# Patient Record
Sex: MALE | Race: WHITE | Employment: OTHER | URBAN - METROPOLITAN AREA
[De-identification: names, ages, dates, MRNs, and addresses within clinical notes are randomized per-mention and may not be internally consistent; named-entity substitution may affect disease eponyms.]

---

## 2017-03-20 ENCOUNTER — HOSPITAL ENCOUNTER (OUTPATIENT)
Age: 77
Discharge: HOME | End: 2017-03-20
Payer: MEDICARE

## 2017-05-01 ENCOUNTER — HOSPITAL ENCOUNTER (OUTPATIENT)
Age: 77
Discharge: HOME | End: 2017-05-01
Payer: MEDICARE

## 2017-06-16 ENCOUNTER — HOSPITAL ENCOUNTER (OUTPATIENT)
Age: 77
Setting detail: SPECIMEN
Discharge: HOME | End: 2017-06-16
Payer: MEDICARE

## 2017-06-16 PROCEDURE — 87493 C DIFF AMPLIFIED PROBE: CPT

## 2017-06-17 LAB
C DIFFICILE TOXINS, PCR: ABNORMAL
SPECIMEN DESCRIPTION: ABNORMAL

## 2021-12-21 ENCOUNTER — HOSPITAL ENCOUNTER (INPATIENT)
Age: 81
LOS: 1 days | Discharge: HOME | DRG: 191 | End: 2021-12-23
Attending: EMERGENCY MEDICINE | Admitting: INTERNAL MEDICINE
Payer: MEDICARE

## 2021-12-21 ENCOUNTER — APPOINTMENT (OUTPATIENT)
Dept: CT IMAGING | Facility: CLINIC | Age: 81
DRG: 191 | End: 2021-12-21
Payer: MEDICARE

## 2021-12-21 ENCOUNTER — APPOINTMENT (OUTPATIENT)
Dept: GENERAL RADIOLOGY | Facility: CLINIC | Age: 81
DRG: 191 | End: 2021-12-21
Payer: MEDICARE

## 2021-12-21 DIAGNOSIS — R09.02 HYPOXIA: ICD-10-CM

## 2021-12-21 DIAGNOSIS — J18.9 PNEUMONIA DUE TO INFECTIOUS ORGANISM, UNSPECIFIED LATERALITY, UNSPECIFIED PART OF LUNG: Primary | ICD-10-CM

## 2021-12-21 LAB
ABSOLUTE EOS #: 0.08 K/UL (ref 0–0.4)
ABSOLUTE IMMATURE GRANULOCYTE: ABNORMAL K/UL (ref 0–0.3)
ABSOLUTE LYMPH #: 0.47 K/UL (ref 1–4.8)
ABSOLUTE MONO #: 0.78 K/UL (ref 0.1–0.8)
ALBUMIN SERPL-MCNC: 4.3 G/DL (ref 3.5–5.2)
ALBUMIN/GLOBULIN RATIO: 2.4 (ref 1–2.5)
ALP BLD-CCNC: 76 U/L (ref 40–129)
ALT SERPL-CCNC: 16 U/L (ref 5–41)
ANION GAP SERPL CALCULATED.3IONS-SCNC: 8 MMOL/L (ref 9–17)
AST SERPL-CCNC: 17 U/L
ATYPICAL LYMPHOCYTE ABSOLUTE COUNT: 0.16 K/UL
ATYPICAL LYMPHOCYTES: 2 %
BASOPHILS # BLD: 0 % (ref 0–2)
BASOPHILS ABSOLUTE: 0 K/UL (ref 0–0.2)
BILIRUB SERPL-MCNC: 0.7 MG/DL (ref 0.3–1.2)
BNP INTERPRETATION: ABNORMAL
BUN BLDV-MCNC: 19 MG/DL (ref 8–23)
BUN/CREAT BLD: ABNORMAL (ref 9–20)
CALCIUM SERPL-MCNC: 9.4 MG/DL (ref 8.6–10.4)
CHLORIDE BLD-SCNC: 98 MMOL/L (ref 98–107)
CO2: 30 MMOL/L (ref 20–31)
CREAT SERPL-MCNC: 1.4 MG/DL (ref 0.7–1.2)
DIFFERENTIAL TYPE: ABNORMAL
DIRECT EXAM: NORMAL
EOSINOPHILS RELATIVE PERCENT: 1 % (ref 1–4)
GFR AFRICAN AMERICAN: 59 ML/MIN
GFR NON-AFRICAN AMERICAN: 49 ML/MIN
GFR SERPL CREATININE-BSD FRML MDRD: ABNORMAL ML/MIN/{1.73_M2}
GFR SERPL CREATININE-BSD FRML MDRD: ABNORMAL ML/MIN/{1.73_M2}
GLUCOSE BLD-MCNC: 110 MG/DL (ref 70–99)
HCT VFR BLD CALC: 39.6 % (ref 41–53)
HEMOGLOBIN: 13.5 G/DL (ref 13.5–17.5)
IMMATURE GRANULOCYTES: ABNORMAL %
INR BLD: 1.3
LACTIC ACID, SEPSIS WHOLE BLOOD: NORMAL MMOL/L (ref 0.5–1.9)
LACTIC ACID, SEPSIS: 1.2 MMOL/L (ref 0.5–1.9)
LIPASE: 16 U/L (ref 13–60)
LYMPHOCYTES # BLD: 6 % (ref 24–44)
Lab: NORMAL
MCH RBC QN AUTO: 32.5 PG (ref 26–34)
MCHC RBC AUTO-ENTMCNC: 34 G/DL (ref 31–37)
MCV RBC AUTO: 95.8 FL (ref 80–100)
MONOCYTES # BLD: 10 % (ref 1–7)
MORPHOLOGY: NORMAL
NRBC AUTOMATED: ABNORMAL PER 100 WBC
PARTIAL THROMBOPLASTIN TIME: 32.9 SEC (ref 21.3–31.3)
PDW BLD-RTO: 12.4 % (ref 12.5–15.4)
PLATELET # BLD: 231 K/UL (ref 140–450)
PLATELET ESTIMATE: ABNORMAL
PMV BLD AUTO: 8 FL (ref 6–12)
POTASSIUM SERPL-SCNC: 4.1 MMOL/L (ref 3.7–5.3)
PRO-BNP: 329 PG/ML
PROTHROMBIN TIME: 14 SEC (ref 9.4–12.6)
RBC # BLD: 4.13 M/UL (ref 4.5–5.9)
RBC # BLD: ABNORMAL 10*6/UL
SARS-COV-2, RAPID: NOT DETECTED
SEG NEUTROPHILS: 81 % (ref 36–66)
SEGMENTED NEUTROPHILS ABSOLUTE COUNT: 6.31 K/UL (ref 1.8–7.7)
SODIUM BLD-SCNC: 136 MMOL/L (ref 135–144)
SPECIMEN DESCRIPTION: NORMAL
SPECIMEN DESCRIPTION: NORMAL
TOTAL PROTEIN: 6.1 G/DL (ref 6.4–8.3)
TROPONIN INTERP: NORMAL
TROPONIN T: NORMAL NG/ML
TROPONIN, HIGH SENSITIVITY: 19 NG/L (ref 0–22)
WBC # BLD: 7.8 K/UL (ref 3.5–11)
WBC # BLD: ABNORMAL 10*3/UL

## 2021-12-21 PROCEDURE — 2580000003 HC RX 258: Performed by: EMERGENCY MEDICINE

## 2021-12-21 PROCEDURE — 87040 BLOOD CULTURE FOR BACTERIA: CPT

## 2021-12-21 PROCEDURE — 99285 EMERGENCY DEPT VISIT HI MDM: CPT

## 2021-12-21 PROCEDURE — 71045 X-RAY EXAM CHEST 1 VIEW: CPT

## 2021-12-21 PROCEDURE — 6370000000 HC RX 637 (ALT 250 FOR IP): Performed by: EMERGENCY MEDICINE

## 2021-12-21 PROCEDURE — 87804 INFLUENZA ASSAY W/OPTIC: CPT

## 2021-12-21 PROCEDURE — 80053 COMPREHEN METABOLIC PANEL: CPT

## 2021-12-21 PROCEDURE — 85730 THROMBOPLASTIN TIME PARTIAL: CPT

## 2021-12-21 PROCEDURE — 71260 CT THORAX DX C+: CPT

## 2021-12-21 PROCEDURE — 85610 PROTHROMBIN TIME: CPT

## 2021-12-21 PROCEDURE — 96361 HYDRATE IV INFUSION ADD-ON: CPT

## 2021-12-21 PROCEDURE — 84484 ASSAY OF TROPONIN QUANT: CPT

## 2021-12-21 PROCEDURE — 83880 ASSAY OF NATRIURETIC PEPTIDE: CPT

## 2021-12-21 PROCEDURE — 83605 ASSAY OF LACTIC ACID: CPT

## 2021-12-21 PROCEDURE — 87635 SARS-COV-2 COVID-19 AMP PRB: CPT

## 2021-12-21 PROCEDURE — 96365 THER/PROPH/DIAG IV INF INIT: CPT

## 2021-12-21 PROCEDURE — 36415 COLL VENOUS BLD VENIPUNCTURE: CPT

## 2021-12-21 PROCEDURE — 6360000004 HC RX CONTRAST MEDICATION: Performed by: EMERGENCY MEDICINE

## 2021-12-21 PROCEDURE — 96360 HYDRATION IV INFUSION INIT: CPT

## 2021-12-21 PROCEDURE — 83690 ASSAY OF LIPASE: CPT

## 2021-12-21 PROCEDURE — 85025 COMPLETE CBC W/AUTO DIFF WBC: CPT

## 2021-12-21 RX ORDER — GLIMEPIRIDE 2 MG/1
2 TABLET ORAL
COMMUNITY
Start: 2021-12-06

## 2021-12-21 RX ORDER — SODIUM CHLORIDE 0.9 % (FLUSH) 0.9 %
10 SYRINGE (ML) INJECTION PRN
Status: COMPLETED | OUTPATIENT
Start: 2021-12-21 | End: 2021-12-21

## 2021-12-21 RX ORDER — METOPROLOL SUCCINATE 25 MG/1
25 TABLET, EXTENDED RELEASE ORAL DAILY
COMMUNITY
Start: 2021-04-01

## 2021-12-21 RX ORDER — MAGNESIUM OXIDE 400 MG/1
400 TABLET ORAL DAILY
COMMUNITY
Start: 2021-09-01

## 2021-12-21 RX ORDER — ATORVASTATIN CALCIUM 10 MG/1
10 TABLET, FILM COATED ORAL NIGHTLY
COMMUNITY
Start: 2021-04-01

## 2021-12-21 RX ORDER — PENTOXIFYLLINE 400 MG/1
400 TABLET, EXTENDED RELEASE ORAL 2 TIMES DAILY
COMMUNITY

## 2021-12-21 RX ORDER — 0.9 % SODIUM CHLORIDE 0.9 %
1000 INTRAVENOUS SOLUTION INTRAVENOUS ONCE
Status: COMPLETED | OUTPATIENT
Start: 2021-12-21 | End: 2021-12-22

## 2021-12-21 RX ORDER — TAMSULOSIN HYDROCHLORIDE 0.4 MG/1
0.4 CAPSULE ORAL 2 TIMES DAILY
COMMUNITY

## 2021-12-21 RX ORDER — 0.9 % SODIUM CHLORIDE 0.9 %
70 INTRAVENOUS SOLUTION INTRAVENOUS ONCE
Status: COMPLETED | OUTPATIENT
Start: 2021-12-21 | End: 2021-12-21

## 2021-12-21 RX ORDER — ACETAMINOPHEN 500 MG
1000 TABLET ORAL ONCE
Status: COMPLETED | OUTPATIENT
Start: 2021-12-21 | End: 2021-12-21

## 2021-12-21 RX ADMIN — SODIUM CHLORIDE 70 ML: 9 INJECTION, SOLUTION INTRAVENOUS at 23:46

## 2021-12-21 RX ADMIN — SODIUM CHLORIDE 1000 ML: 9 INJECTION, SOLUTION INTRAVENOUS at 21:59

## 2021-12-21 RX ADMIN — Medication 10 ML: at 23:44

## 2021-12-21 RX ADMIN — IOPAMIDOL 75 ML: 755 INJECTION, SOLUTION INTRAVENOUS at 23:45

## 2021-12-21 RX ADMIN — ACETAMINOPHEN 1000 MG: 500 TABLET ORAL at 21:16

## 2021-12-21 ASSESSMENT — PAIN SCALES - GENERAL: PAINLEVEL_OUTOF10: 0

## 2021-12-22 PROBLEM — I25.10 CORONARY ATHEROSCLEROSIS: Status: ACTIVE | Noted: 2021-12-22

## 2021-12-22 PROBLEM — E11.9 TYPE 2 DIABETES MELLITUS WITHOUT COMPLICATION (HCC): Status: ACTIVE | Noted: 2021-12-22

## 2021-12-22 PROBLEM — Z20.822 COVID-19 RULED OUT: Status: ACTIVE | Noted: 2021-12-22

## 2021-12-22 PROBLEM — R09.02 HYPOXIA: Status: ACTIVE | Noted: 2021-12-22

## 2021-12-22 PROBLEM — I50.22 CHRONIC SYSTOLIC CONGESTIVE HEART FAILURE (HCC): Status: ACTIVE | Noted: 2019-05-03

## 2021-12-22 PROBLEM — J44.9 CHRONIC OBSTRUCTIVE PULMONARY DISEASE (HCC): Status: ACTIVE | Noted: 2021-06-04

## 2021-12-22 PROBLEM — I48.0 PAROXYSMAL ATRIAL FIBRILLATION (HCC): Status: ACTIVE | Noted: 2021-06-04

## 2021-12-22 LAB
ADENOVIRUS PCR: NOT DETECTED
BORDETELLA PARAPERTUSSIS: NOT DETECTED
BORDETELLA PERTUSSIS PCR: NOT DETECTED
CHLAMYDIA PNEUMONIAE BY PCR: NOT DETECTED
CORONAVIRUS 229E PCR: NOT DETECTED
CORONAVIRUS HKU1 PCR: NOT DETECTED
CORONAVIRUS NL63 PCR: NOT DETECTED
CORONAVIRUS OC43 PCR: NOT DETECTED
EKG ATRIAL RATE: 52 BPM
EKG P AXIS: 82 DEGREES
EKG P-R INTERVAL: 220 MS
EKG Q-T INTERVAL: 434 MS
EKG QRS DURATION: 80 MS
EKG QTC CALCULATION (BAZETT): 403 MS
EKG R AXIS: 1 DEGREES
EKG T AXIS: 76 DEGREES
EKG VENTRICULAR RATE: 52 BPM
HUMAN METAPNEUMOVIRUS PCR: NOT DETECTED
INFLUENZA A BY PCR: NOT DETECTED
INFLUENZA A H1 (2009) PCR: NORMAL
INFLUENZA A H1 PCR: NORMAL
INFLUENZA A H3 PCR: NORMAL
INFLUENZA B BY PCR: NOT DETECTED
MYCOPLASMA PNEUMONIAE PCR: NOT DETECTED
PARAINFLUENZA 1 PCR: NOT DETECTED
PARAINFLUENZA 2 PCR: NOT DETECTED
PARAINFLUENZA 3 PCR: NOT DETECTED
PARAINFLUENZA 4 PCR: NOT DETECTED
RESP SYNCYTIAL VIRUS PCR: NOT DETECTED
RHINO/ENTEROVIRUS PCR: NOT DETECTED
SARS-COV-2, PCR: NOT DETECTED
SPECIMEN DESCRIPTION: NORMAL

## 2021-12-22 PROCEDURE — 6370000000 HC RX 637 (ALT 250 FOR IP): Performed by: NURSE PRACTITIONER

## 2021-12-22 PROCEDURE — 2700000000 HC OXYGEN THERAPY PER DAY

## 2021-12-22 PROCEDURE — 6360000002 HC RX W HCPCS: Performed by: NURSE PRACTITIONER

## 2021-12-22 PROCEDURE — 6360000002 HC RX W HCPCS: Performed by: EMERGENCY MEDICINE

## 2021-12-22 PROCEDURE — 93005 ELECTROCARDIOGRAM TRACING: CPT | Performed by: EMERGENCY MEDICINE

## 2021-12-22 PROCEDURE — 99222 1ST HOSP IP/OBS MODERATE 55: CPT | Performed by: NURSE PRACTITIONER

## 2021-12-22 PROCEDURE — 2580000003 HC RX 258: Performed by: NURSE PRACTITIONER

## 2021-12-22 PROCEDURE — 0202U NFCT DS 22 TRGT SARS-COV-2: CPT

## 2021-12-22 PROCEDURE — 2060000000 HC ICU INTERMEDIATE R&B

## 2021-12-22 RX ORDER — NICOTINE POLACRILEX 4 MG
15 LOZENGE BUCCAL PRN
Status: DISCONTINUED | OUTPATIENT
Start: 2021-12-22 | End: 2021-12-23 | Stop reason: HOSPADM

## 2021-12-22 RX ORDER — IPRATROPIUM BROMIDE AND ALBUTEROL SULFATE 2.5; .5 MG/3ML; MG/3ML
1 SOLUTION RESPIRATORY (INHALATION)
Status: DISCONTINUED | OUTPATIENT
Start: 2021-12-22 | End: 2021-12-22

## 2021-12-22 RX ORDER — DEXTROSE MONOHYDRATE 50 MG/ML
100 INJECTION, SOLUTION INTRAVENOUS PRN
Status: DISCONTINUED | OUTPATIENT
Start: 2021-12-22 | End: 2021-12-23 | Stop reason: HOSPADM

## 2021-12-22 RX ORDER — ONDANSETRON 4 MG/1
4 TABLET, ORALLY DISINTEGRATING ORAL EVERY 8 HOURS PRN
Status: DISCONTINUED | OUTPATIENT
Start: 2021-12-22 | End: 2021-12-23 | Stop reason: HOSPADM

## 2021-12-22 RX ORDER — BUMETANIDE 1 MG/1
1 TABLET ORAL DAILY
COMMUNITY

## 2021-12-22 RX ORDER — BUMETANIDE 1 MG/1
1 TABLET ORAL DAILY
Status: DISCONTINUED | OUTPATIENT
Start: 2021-12-22 | End: 2021-12-23 | Stop reason: HOSPADM

## 2021-12-22 RX ORDER — PREDNISONE 20 MG/1
40 TABLET ORAL DAILY
Status: DISCONTINUED | OUTPATIENT
Start: 2021-12-22 | End: 2021-12-23 | Stop reason: HOSPADM

## 2021-12-22 RX ORDER — DEXTROSE MONOHYDRATE 25 G/50ML
12.5 INJECTION, SOLUTION INTRAVENOUS PRN
Status: DISCONTINUED | OUTPATIENT
Start: 2021-12-22 | End: 2021-12-23 | Stop reason: HOSPADM

## 2021-12-22 RX ORDER — POLYETHYLENE GLYCOL 3350 17 G/17G
17 POWDER, FOR SOLUTION ORAL DAILY PRN
Status: DISCONTINUED | OUTPATIENT
Start: 2021-12-22 | End: 2021-12-23 | Stop reason: HOSPADM

## 2021-12-22 RX ORDER — ACETAMINOPHEN 325 MG/1
650 TABLET ORAL EVERY 6 HOURS PRN
Status: DISCONTINUED | OUTPATIENT
Start: 2021-12-22 | End: 2021-12-23 | Stop reason: HOSPADM

## 2021-12-22 RX ORDER — MAGNESIUM SULFATE 1 G/100ML
1000 INJECTION INTRAVENOUS ONCE
Status: COMPLETED | OUTPATIENT
Start: 2021-12-22 | End: 2021-12-22

## 2021-12-22 RX ORDER — LEVOFLOXACIN 5 MG/ML
750 INJECTION, SOLUTION INTRAVENOUS ONCE
Status: COMPLETED | OUTPATIENT
Start: 2021-12-22 | End: 2021-12-22

## 2021-12-22 RX ORDER — SODIUM CHLORIDE 9 MG/ML
INJECTION, SOLUTION INTRAVENOUS CONTINUOUS
Status: DISCONTINUED | OUTPATIENT
Start: 2021-12-22 | End: 2021-12-23 | Stop reason: HOSPADM

## 2021-12-22 RX ORDER — FAMOTIDINE 20 MG/1
20 TABLET, FILM COATED ORAL NIGHTLY
Status: DISCONTINUED | OUTPATIENT
Start: 2021-12-22 | End: 2021-12-23 | Stop reason: HOSPADM

## 2021-12-22 RX ORDER — FAMOTIDINE 20 MG/1
20 TABLET, FILM COATED ORAL NIGHTLY
COMMUNITY

## 2021-12-22 RX ORDER — ONDANSETRON 2 MG/ML
4 INJECTION INTRAMUSCULAR; INTRAVENOUS EVERY 6 HOURS PRN
Status: DISCONTINUED | OUTPATIENT
Start: 2021-12-22 | End: 2021-12-23 | Stop reason: HOSPADM

## 2021-12-22 RX ORDER — GLIPIZIDE 5 MG/1
5 TABLET ORAL
Status: DISCONTINUED | OUTPATIENT
Start: 2021-12-23 | End: 2021-12-23 | Stop reason: HOSPADM

## 2021-12-22 RX ORDER — TAMSULOSIN HYDROCHLORIDE 0.4 MG/1
0.4 CAPSULE ORAL 2 TIMES DAILY
Status: DISCONTINUED | OUTPATIENT
Start: 2021-12-22 | End: 2021-12-23 | Stop reason: HOSPADM

## 2021-12-22 RX ORDER — ACETAMINOPHEN 650 MG/1
650 SUPPOSITORY RECTAL EVERY 6 HOURS PRN
Status: DISCONTINUED | OUTPATIENT
Start: 2021-12-22 | End: 2021-12-23 | Stop reason: HOSPADM

## 2021-12-22 RX ORDER — ATORVASTATIN CALCIUM 10 MG/1
10 TABLET, FILM COATED ORAL NIGHTLY
Status: DISCONTINUED | OUTPATIENT
Start: 2021-12-22 | End: 2021-12-23 | Stop reason: HOSPADM

## 2021-12-22 RX ORDER — SODIUM CHLORIDE 9 MG/ML
25 INJECTION, SOLUTION INTRAVENOUS PRN
Status: DISCONTINUED | OUTPATIENT
Start: 2021-12-22 | End: 2021-12-23 | Stop reason: HOSPADM

## 2021-12-22 RX ORDER — METHYLPREDNISOLONE SODIUM SUCCINATE 125 MG/2ML
125 INJECTION, POWDER, LYOPHILIZED, FOR SOLUTION INTRAMUSCULAR; INTRAVENOUS ONCE
Status: COMPLETED | OUTPATIENT
Start: 2021-12-22 | End: 2021-12-22

## 2021-12-22 RX ORDER — SODIUM CHLORIDE 0.9 % (FLUSH) 0.9 %
5-40 SYRINGE (ML) INJECTION PRN
Status: DISCONTINUED | OUTPATIENT
Start: 2021-12-22 | End: 2021-12-23 | Stop reason: HOSPADM

## 2021-12-22 RX ORDER — METOPROLOL SUCCINATE 25 MG/1
25 TABLET, EXTENDED RELEASE ORAL DAILY
Status: DISCONTINUED | OUTPATIENT
Start: 2021-12-23 | End: 2021-12-23 | Stop reason: HOSPADM

## 2021-12-22 RX ORDER — PENTOXIFYLLINE 400 MG/1
400 TABLET, EXTENDED RELEASE ORAL 2 TIMES DAILY
Status: DISCONTINUED | OUTPATIENT
Start: 2021-12-22 | End: 2021-12-23 | Stop reason: HOSPADM

## 2021-12-22 RX ORDER — SODIUM CHLORIDE 0.9 % (FLUSH) 0.9 %
5-40 SYRINGE (ML) INJECTION EVERY 12 HOURS SCHEDULED
Status: DISCONTINUED | OUTPATIENT
Start: 2021-12-22 | End: 2021-12-23 | Stop reason: HOSPADM

## 2021-12-22 RX ORDER — ALBUTEROL SULFATE 2.5 MG/3ML
2.5 SOLUTION RESPIRATORY (INHALATION)
Status: DISCONTINUED | OUTPATIENT
Start: 2021-12-22 | End: 2021-12-23 | Stop reason: HOSPADM

## 2021-12-22 RX ADMIN — ATORVASTATIN CALCIUM 10 MG: 10 TABLET, FILM COATED ORAL at 21:43

## 2021-12-22 RX ADMIN — TAMSULOSIN HYDROCHLORIDE 0.4 MG: 0.4 CAPSULE ORAL at 21:43

## 2021-12-22 RX ADMIN — SODIUM CHLORIDE, PRESERVATIVE FREE 10 ML: 5 INJECTION INTRAVENOUS at 21:47

## 2021-12-22 RX ADMIN — SODIUM CHLORIDE: 9 INJECTION, SOLUTION INTRAVENOUS at 21:46

## 2021-12-22 RX ADMIN — MAGNESIUM SULFATE HEPTAHYDRATE 1000 MG: 1 INJECTION, SOLUTION INTRAVENOUS at 02:58

## 2021-12-22 RX ADMIN — BUMETANIDE 1 MG: 1 TABLET ORAL at 14:10

## 2021-12-22 RX ADMIN — LEVOFLOXACIN 750 MG: 5 INJECTION, SOLUTION INTRAVENOUS at 01:32

## 2021-12-22 RX ADMIN — PENTOXIFYLLINE 400 MG: 400 TABLET, EXTENDED RELEASE ORAL at 21:43

## 2021-12-22 RX ADMIN — FAMOTIDINE 20 MG: 20 TABLET ORAL at 21:43

## 2021-12-22 RX ADMIN — AZITHROMYCIN DIHYDRATE 500 MG: 500 INJECTION, POWDER, LYOPHILIZED, FOR SOLUTION INTRAVENOUS at 04:05

## 2021-12-22 RX ADMIN — RIVAROXABAN 20 MG: 20 TABLET, FILM COATED ORAL at 17:00

## 2021-12-22 RX ADMIN — METHYLPREDNISOLONE SODIUM SUCCINATE 125 MG: 125 INJECTION, POWDER, FOR SOLUTION INTRAMUSCULAR; INTRAVENOUS at 02:56

## 2021-12-22 RX ADMIN — SODIUM CHLORIDE: 9 INJECTION, SOLUTION INTRAVENOUS at 05:17

## 2021-12-22 RX ADMIN — PREDNISONE 40 MG: 20 TABLET ORAL at 14:04

## 2021-12-22 RX ADMIN — SODIUM CHLORIDE, PRESERVATIVE FREE 10 ML: 5 INJECTION INTRAVENOUS at 10:00

## 2021-12-22 ASSESSMENT — PAIN SCALES - GENERAL: PAINLEVEL_OUTOF10: 0

## 2021-12-22 NOTE — PLAN OF CARE
Problem: Falls - Risk of:  Goal: Will remain free from falls  Description: Will remain free from falls  Outcome: Ongoing  Note: Patient remains free from falls and injuries. Call light with in reach and patient close to nurse station. Will continue to round hourly and more often as needed.

## 2021-12-22 NOTE — ED PROVIDER NOTES
[facility]  [facility]  Phone: [phone]      Pt Name: Kerrie Jones  ROP:[id number]  [place] 1940  Date of evaluation: 12/21/2021      CHIEF COMPLAINT       Chief Complaint   Patient presents with    Fever    Fatigue       HISTORY OF PRESENT ILLNESS   Kerrie Jones is a [de-identified] y.o. male with history of CHF, type 2 diabetes, hypertension, hyperlipidemia, CAD status post 1 stent, non-Hodgkin's lymphoma, A. fib on Xarelto and cholecystectomy who presents for evaluation of generalized malaise and chills. History is supplemented by the patient's son who is at bedside. The patient reports that starting this afternoon he developed chills and lay down for a 3-hour nap. He woke up but felt fatigued with decreased appetite, fever of 100.4, generalized weakness, increased urinary output, and increased thirst.  The patient states that he has had similar symptoms in the past when he had pneumonia. His family took his pulse ox and he was found to have oxygen desaturations into the 80s. The patient did not take any medications for symptoms and does not list any provoking or palliating factors. He is on Xarelto for A. fib and denies any history of blood clots. The patient denies missing any doses of his medications. He had both vaccinations against COVID-19 and received his booster approximately 6 weeks ago. The patient denies headache, vision changes, neck pain, back pain, chest pain, shortness of breath, nausea, vomiting, sick contacts, abdominal pain, bowel symptoms, focal weakness, numbness, tingling, recent injury or illness. REVIEW OF SYSTEMS     Ten point review of systems was reviewed and is negative unless otherwise noted in the HPI    [address]    has a past medical history of CAD (coronary artery disease), CHF (congestive heart failure) ([facility] [address] Diabetes mellitus ([facility] [address] and Hypertension. SURGICAL HISTORY      has no past surgical history on file.   The radiation dose to as low as reasonably achievable. COMPARISON: None. HISTORY: ORDERING SYSTEM PROVIDED HISTORY: hypoxia, fever, abnormal nodule seen on xr solomon TECHNOLOGIST PROVIDED HISTORY: hypoxia, fever, abnormal nodule seen on xr solomon Decision Support Exception - unselect if not a suspected or confirmed emergency medical condition->Emergency Medical Condition (MA) Reason for Exam: Fever, fatigue, and weakness tonight FINDINGS: Pulmonary Arteries: Pulmonary arteries are adequately opacified for evaluation. No evidence of intraluminal filling defect to suggest pulmonary embolism. Main pulmonary artery is normal in caliber. Mediastinum: Atherosclerosis of the thoracic aorta and coronary arteries. No pericardial effusion. No adenopathy. Lungs/pleura: No pneumothorax. No pleural effusion. No pulmonary consolidation, mass, or suspicious nodule. Dependent atelectasis in the bilateral lower lobes. Scattered linear scars in the bilateral lungs. Upper Abdomen: Limited images of the upper abdomen are unremarkable. Soft Tissues/Bones: Multilevel thoracic spondylosis. No acute finding in the chest with no evidence of pulmonary embolism. RECOMMENDATIONS: Unavailable       LABS:  Results for orders placed or performed during the hospital encounter of 12/21/21   Rapid influenza A/B antigens    Specimen: Nares   Result Value Ref Range    Specimen Description . NARES     Special Requests NOT REPORTED     Direct Exam       NEGATIVE for Influenza A + B antigens. PCR testing to confirm this result is available upon request.  Specimen will be saved in the laboratory for 7 days. Please call [phone] if PCR testing is indicated. COVID-19, Rapid    Specimen: Nasopharyngeal Swab   Result Value Ref Range    Specimen Description . NASOPHARYNGEAL SWAB     SARS-CoV-2, Rapid Not Detected Not Detected   CBC auto differential   Result Value Ref Range    WBC 7.8 3.5 - 11.0 k/uL    RBC 4.13 (L) 4.5 - 5.9 m/uL    Hemoglobin 13.5 13.5 - 17.5 g/dL    Hematocrit 39.6 (L) 41 - 53 %    MCV 95.8 80 - 100 fL    MCH 32.5 26 - 34 pg    MCHC 34.0 31 - 37 g/dL    RDW 12.4 (L) 12.5 - 15.4 %    Platelets 323 721 - 400 k/uL    MPV 8.0 6.0 - 12.0 fL    NRBC Automated NOT REPORTED per 100 WBC    Differential Type NOT REPORTED     Immature Granulocytes NOT REPORTED 0 %    Absolute Immature Granulocyte NOT REPORTED 0.00 - 0.30 k/uL    WBC Morphology NOT REPORTED     RBC Morphology NOT REPORTED     Platelet Estimate NOT REPORTED     Seg Neutrophils 81 (H) 36 - 66 %    Lymphocytes 6 (L) 24 - 44 %    Atypical Lymphocytes 2 %    Monocytes 10 (H) 1 - 7 %    Eosinophils % 1 1 - 4 %    Basophils 0 0 - 2 %    Segs Absolute 6.31 1.8 - 7.7 k/uL    Absolute Lymph # 0.47 (L) 1.0 - 4.8 k/uL    Atypical Lymphocytes Absolute 0.16 k/uL    Absolute Mono # 0.78 0.1 - 0.8 k/uL    Absolute Eos # 0.08 0.0 - 0.4 k/uL    Basophils Absolute 0.00 0.0 - 0.2 k/uL    Morphology Normal    Comprehensive Metabolic Panel w/ Reflex to MG   Result Value Ref Range    Glucose 110 (H) 70 - 99 mg/dL    BUN 19 8 - 23 mg/dL    CREATININE 1.40 (H) 0.70 - 1.20 mg/dL    Bun/Cre Ratio NOT REPORTED 9 - 20    Calcium 9.4 8.6 - 10.4 mg/dL    Sodium 136 135 - 144 mmol/L    Potassium 4.1 3.7 - 5.3 mmol/L    Chloride 98 98 - 107 mmol/L    CO2 30 20 - 31 mmol/L    Anion Gap 8 (L) 9 - 17 mmol/L    Alkaline Phosphatase 76 40 - 129 U/L    ALT 16 5 - 41 U/L    AST 17 <40 U/L    Total Bilirubin 0.70 0.3 - 1.2 mg/dL    Total Protein 6.1 (L) 6.4 - 8.3 g/dL    Albumin 4.3 3.5 - 5.2 g/dL    Albumin/Globulin Ratio 2.4 1.0 - 2.5    GFR Non- 49 (L) >60 mL/min    GFR  59 (L) >60 mL/min    GFR Comment          GFR Staging NOT REPORTED    Lactate, Sepsis   Result Value Ref Range    Lactic Acid, Sepsis 1.2 0.5 - 1.9 mmol/L    Lactic Acid, Sepsis, Whole Blood NOT REPORTED 0.5 - 1.9 mmol/L   APTT   Result Value Ref Range    PTT 32.9 (H) 21.3 - 31.3 sec Protime-INR   Result Value Ref Range    Protime 14.0 (H) 9.4 - 12.6 sec    INR 1.3    Lipase   Result Value Ref Range    Lipase 16 13 - 60 U/L   Troponin   Result Value Ref Range    Troponin, High Sensitivity 19 0 - 22 ng/L    Troponin T NOT REPORTED <0.03 ng/mL    Troponin Interp NOT REPORTED    Brain Natriuretic Peptide   Result Value Ref Range    Pro- (H) <300 pg/mL    BNP Interpretation NOT REPORTED        EMERGENCY DEPARTMENT COURSE:        The patient was given the following medications:  Orders Placed This Encounter   Medications    0.9 % sodium chloride bolus    acetaminophen (TYLENOL) tablet 1,000 mg    0.9 % sodium chloride bolus    iopamidol (ISOVUE-370) 76 % injection 75 mL    sodium chloride flush 0.9 % injection 10 mL    DISCONTD: doxycycline (VIBRAMYCIN) 100 mg in dextrose 5 % 100 mL IVPB     Order Specific Question:   Antimicrobial Indications     Answer:   Pneumonia (CAP)    levoFLOXacin (LEVAQUIN) 750 MG/150ML infusion 750 mg     Order Specific Question:   Antimicrobial Indications     Answer:   Pneumonia (CAP)    methylPREDNISolone sodium (SOLU-MEDROL) injection 125 mg    magnesium sulfate 1000 mg in dextrose 5% 100 mL IVPB        Vitals:    Vitals:    12/21/21 2236 12/22/21 0000 12/22/21 0135 12/22/21 0303   BP:  129/70 126/70 (!) 125/108   Pulse: 52 54 55 57   Resp: 15 16 16 16   Temp:    98.3 °F (36.8 °C)   TempSrc:    Oral   SpO2: 96% 97% 95% 96%   Weight:       Height:         -------------------------  BP: (!) 125/108, Temp: 98.3 °F (36.8 °C), Pulse: 57, Resp: 16    CONSULTS:  None    CRITICAL CARE:   None    PROCEDURES:  None    DIAGNOSIS/ MDM:   Marisa Jimenez is a [de-identified] y.o. male who presents with fever, fatigue, and hypoxia. The patient arrives hypoxic at 80% and was placed on 2 L of nasal cannula and has maintained oxygen saturation in the mid 90s with this. He defervesced with Tylenol. Septic work-up was initiated. Vital signs are otherwise stable.   Lungs are diminished at the bases. Mucous membranes dry. CBC is unremarkable. CMP reveals creatinine of 1.4 but this is unchanged from baseline. Lactic acid, coags, lipase, troponin are unremarkable. BNP is slightly elevated at 329 but he does not have any clinical signs of fluid overload. Rapid Covid and rapid flu are negative. Chest x-ray shows an irregular density projecting over the left lung base and radiology recommended CT. CT chest shows no acute findings in the chest and no PE. At this time I still have high clinical suspicion for pneumonia. He has an allergy to penicillins so I started him on Levaquin. I also treated him with Solu-Medrol and magnesium in case he has an underlying COPD component. There does not seem to be any significant CHF; however, I did not want to give him the full 30 cc/kg IV fluid bolus because he is not hypotensive, his lactic acid is normal, and he does have history of CHF. The patient is still requiring oxygen and will need to be admitted. I spoke to the [facility], [address], who agrees to accept the patient at [phone]. The patient will be boarding in the ER until a bed becomes available. FINAL IMPRESSION      1. Pneumonia due to infectious organism, unspecified laterality, unspecified part of lung    2.  Hypoxia          DISPOSITION/PLAN   DISPOSITION Admitted 12/22/2021 02:04:25 AM        (Please note that portions of this note were completed with a voice recognitionprogram.  Efforts were made to edit the dictations but occasionally words are mis-transcribed.)    Lacy Bonilla DO, [facility]  Emergency Physician Attending         Lacy Bonilla DO  12/22/21 1835

## 2021-12-22 NOTE — H&P
[facility]  Office: [address], DO, Deirdre Din, DO, Milton Acron, DO, Sim Mccormick Blood, DO, Francisco Javier Jones MD, Ariel Bowles MD, Tena Castellano MD, Gal Giron MD, Yulia Dasilva MD, Osmar Skaggs MD, Rocio Pickett MD, Nathan Dai, DO, Vito Lorenz, DO, Ida Cristina MD,  Ángel Reyes, DO, Umer Gould MD, Graciela Fish MD, Anum Carmona MD, Migel Castaneda MD, Freddie Mustafa MD, Felicitas Mcqueen MD, Geovanna Spencer MD, Laura Freire, [facility], [facility], CNP, Erin Mendoza, CNP, Laura Larry, CNS, Chalino Koo, CNP, Adelia Alpers, CNP, Ludin Rodriguez, CNP, Kendy Nevarez, CNP, [address], CNP, Raghav Harris PA-C, Radha Mccollum, [facility], Jorge Luis Paulson, [facility], Pardeep Pham, CNP, Tavia Billings, CNP, Andrew Young, CNP, Domenic Hale, CNP, Radha Rao, CNP, Katya Breaux, [address]    HISTORY AND PHYSICAL EXAMINATION            Date:   12/22/2021  Patient name:  Halle Carlos  Date of admission:  12/21/2021  8:09 PM  MRN:   [id number]  Account:  [de-identified]  YOB: 1940  PCP:    Hardeep Charles MD  Room:   [id number]  Code Status:    Full Code    Chief Complaint:     Chief Complaint   Patient presents with    Fever    Fatigue       History Obtained From:     patient    History of Present Illness:     Halle Carlos is a [de-identified] y.o. Unavailable / unknown male who presents with Fever and Fatigue   and is admitted to the hospital for the management of <principal problem not specified>. Patient reports to the emergency department at the insistence of his family. Patient has a personal history of COPD with recurrent bacterial pneumonia. Patient has been having fever and chills for the past 24 to 36 hours as well as exertional dyspnea and weakness. Family insisted that patient be evaluated in the emergency department where he was found to be hypoxic with a sat of 88%.   Patient's chest x-ray is unremarkable for bacterial consolidation. A CTA was completed to rule out PE which was negative. Patient's Covid swab is negative as well. Patient is highly motivated for discharge and is quite annoyed that his family for bringing him to the emergency department. Patient continues to require supplemental oxygen to maintain his sats greater than 93%. Patient has no complaints at the time my exam.    Past Medical History:     Past Medical History:   Diagnosis Date    CAD (coronary artery disease)     [facility]est[facility] failure) ([facility] [address]     Diabetes mellitus ([facility] [address]     Hypertension         Past Surgical History:     History reviewed. No pertinent surgical history. Medications Prior to Admission:     Prior to Admission medications    Medication Sig Start Date End Date Taking?  Authorizing Provider   famotidine (PEPCID) 20 MG tablet Take 20 mg by mouth nightly   Yes Historical Provider, MD   bumetanide (BUMEX) 1 MG tablet Take 1 mg by mouth daily   Yes Historical Provider, MD   atorvastatin (LIPITOR) 10 MG tablet Take 10 mg by mouth nightly 4/1/21  Yes Historical Provider, MD   glimepiride (AMARYL) 2 MG tablet Take 2 mg by mouth every morning (before breakfast) 12/6/21  Yes Historical Provider, MD   magnesium oxide (MAG-OX) 400 MG tablet Take 400 mg by mouth daily  9/1/21  Yes Historical Provider, MD   metoprolol succinate (TOPROL XL) 25 MG extended release tablet Take 25 mg by mouth daily 4/1/21  Yes Historical Provider, MD   tamsulosin (FLOMAX) 0.4 MG capsule Take 0.4 mg by mouth 2 times daily    Yes Historical Provider, MD   rivaroxaban (XARELTO) 20 MG TABS tablet Take 20 mg by mouth daily 4/1/21  Yes Historical Provider, MD   pentoxifylline (TRENTAL) 400 MG extended release tablet Take 400 mg by mouth 2 times daily    Yes Historical Provider, MD        Allergies:     Acetaminophen-codeine, Benadryl [diphenhydramine], Codeine, Hydrocodone-acetaminophen, Meperidine, Morpholine salicylate, Penicillins, and 12.5 - 15.4 %    Platelets 423 793 - 245 k/uL    MPV 8.0 6.0 - 12.0 fL    NRBC Automated NOT REPORTED per 100 WBC    Differential Type NOT REPORTED     Immature Granulocytes NOT REPORTED 0 %    Absolute Immature Granulocyte NOT REPORTED 0.00 - 0.30 k/uL    WBC Morphology NOT REPORTED     RBC Morphology NOT REPORTED     Platelet Estimate NOT REPORTED     Seg Neutrophils 81 (H) 36 - 66 %    Lymphocytes 6 (L) 24 - 44 %    Atypical Lymphocytes 2 %    Monocytes 10 (H) 1 - 7 %    Eosinophils % 1 1 - 4 %    Basophils 0 0 - 2 %    Segs Absolute 6.31 1.8 - 7.7 k/uL    Absolute Lymph # 0.47 (L) 1.0 - 4.8 k/uL    Atypical Lymphocytes Absolute 0.16 k/uL    Absolute Mono # 0.78 0.1 - 0.8 k/uL    Absolute Eos # 0.08 0.0 - 0.4 k/uL    Basophils Absolute 0.00 0.0 - 0.2 k/uL    Morphology Normal    Comprehensive Metabolic Panel w/ Reflex to MG    Collection Time: 12/21/21  9:50 PM   Result Value Ref Range    Glucose 110 (H) 70 - 99 mg/dL    BUN 19 8 - 23 mg/dL    CREATININE 1.40 (H) 0.70 - 1.20 mg/dL    Bun/Cre Ratio NOT REPORTED 9 - 20    Calcium 9.4 8.6 - 10.4 mg/dL    Sodium 136 135 - 144 mmol/L    Potassium 4.1 3.7 - 5.3 mmol/L    Chloride 98 98 - 107 mmol/L    CO2 30 20 - 31 mmol/L    Anion Gap 8 (L) 9 - 17 mmol/L    Alkaline Phosphatase 76 40 - 129 U/L    ALT 16 5 - 41 U/L    AST 17 <40 U/L    Total Bilirubin 0.70 0.3 - 1.2 mg/dL    Total Protein 6.1 (L) 6.4 - 8.3 g/dL    Albumin 4.3 3.5 - 5.2 g/dL    Albumin/Globulin Ratio 2.4 1.0 - 2.5    GFR Non- 49 (L) >60 mL/min    GFR  59 (L) >60 mL/min    GFR Comment          GFR Staging NOT REPORTED    Lactate, Sepsis    Collection Time: 12/21/21  9:50 PM   Result Value Ref Range    Lactic Acid, Sepsis 1.2 0.5 - 1.9 mmol/L    Lactic Acid, Sepsis, Whole Blood NOT REPORTED 0.5 - 1.9 mmol/L   Culture, Blood 1    Collection Time: 12/21/21  9:50 PM    Specimen: Blood   Result Value Ref Range    Specimen Description . BLOOD     Special Requests LT AC 20ML with peripheral angiopathy without gangrene ([facility] 12/22/2021 Yes    Hyperlipidemia 12/22/2021 Yes    Ischemic cardiomyopathy 12/22/2021 Yes    Type 2 diabetes mellitus without complication ([facility] 91/01/6961 Yes    Paroxysmal atrial fibrillation ([facility] 12/22/2021 Yes          Plan:     Patient status inpatient in the Progressive Unit/Step down    1. Hypoxia of unknown causes with a personal history of COPD and recurrent community-acquired pneumonia  1. Viral PCR test as ordered, low suspicion for Covid  2. Initiate taper prednisone dose for COPD exacerbation  3. Narrow antibiotic coverage to azithromycin 500 mg x 3 days to cover for acute bronchitis  4. Scheduled and as needed breathing treatments with duo nebs as patient is unable to tolerate inhalers  5. If patient respiratory status improves throughout the day and he responds well to steroids and nebulizer treatments patient could be discharged as early as this afternoon. 2. Chronic systolic heart failure with ischemic cardiomyopathy  1. No indication for fluid volume overload, maintain diuretics at home dosage  3. Benign essential hypertension, hyperlipidemia, proximal atrial fibrillation  1. Continue home medication regimen  4. Type 2 diabetes  1. Continue home medication regiment      Consultations:   None    Patient is admitted as inpatient status because of co-morbidities listed above, severity of signs and symptoms as outlined, requirement for current medical therapies and most importantly because of direct risk to patient if care not provided in a hospital setting. Expected length of stay > 48 hours.     PEDRO Garcia NP  12/22/2021  1:11 PM    Copy sent to Dr. Khadra Salgado MD

## 2021-12-22 NOTE — PROGRESS NOTES
Transitions of Care Pharmacy Service   Medication Review    The patient's list of current home medications has been reviewed. In covid isolation; attempted to interview pt via phone but he did not answer. His med list is correct per PCP office and pharmacy. Unable to assess use of additional OTC products at this time. Source(s) of information: PCP office, [facility]      Please feel free to call me with any questions about this encounter. Thank you.     Allegra Dominguez [facility]   Transitions of Care Pharmacy Service  Phone:  [phone]  Fax: [phone]      Electronically signed by Allegra Dominguez [facility] on 12/22/2021 at 6:55 PM           Medications Prior to Admission:   famotidine (PEPCID) 20 MG tablet, Take 20 mg by mouth nightly  bumetanide (BUMEX) 1 MG tablet, Take 1 mg by mouth daily  atorvastatin (LIPITOR) 10 MG tablet, Take 10 mg by mouth nightly  glimepiride (AMARYL) 2 MG tablet, Take 2 mg by mouth every morning (before breakfast)  magnesium oxide (MAG-OX) 400 MG tablet, Take 400 mg by mouth daily   metoprolol succinate (TOPROL XL) 25 MG extended release tablet, Take 25 mg by mouth daily  tamsulosin (FLOMAX) 0.4 MG capsule, Take 0.4 mg by mouth 2 times daily   rivaroxaban (XARELTO) 20 MG TABS tablet, Take 20 mg by mouth daily  pentoxifylline (TRENTAL) 400 MG extended release tablet, Take 400 mg by mouth 2 times daily

## 2021-12-22 NOTE — ED NOTES
Patient to ED via self and son ambulatory to room 2  Patient here for complaint of fevers, fatigue, chills, and low oxygen levels  Son states that the patient hasnt been feeling well for the last couple of days but today the patient took a nap for three hours which is an extended period of time for baseline  Son states they have an at home pulse oximeter which has been reading around 89-90% while on room air  Patient himself denies any CP, SOB, or N/V but states a general unwell feeling  Placed on 2L of NC O2 brining O2 up to 97%    Vitals obtained and call light provided  Patient resting comfortably on stretcher in no apparent distress  Respirations even and non-labored  Awaiting physician evaluation     Brayan Sanchez RN  12/21/21 2054

## 2021-12-22 NOTE — RT PROTOCOL NOTE
on this medication at home then do not decrease Frequency below that used at home. 0-3 - enter or revise RT bronchodilator order(s) to equivalent RT Bronchodilator order with Frequency of every 4 hours PRN for wheezing or increased work of breathing using Per Protocol order mode. 4-6 - enter or revise RT Bronchodilator order(s) to two equivalent RT bronchodilator orders with one order with BID Frequency and one order with Frequency of every 4 hours PRN wheezing or increased work of breathing using Per Protocol order mode. 7-10 - enter or revise RT Bronchodilator order(s) to two equivalent RT bronchodilator orders with one order with TID Frequency and one order with Frequency of every 4 hours PRN wheezing or increased work of breathing using Per Protocol order mode. 11-13 - enter or revise RT Bronchodilator order(s) to one equivalent RT bronchodilator order with QID Frequency and an Albuterol order with Frequency of every 4 hours PRN wheezing or increased work of breathing using Per Protocol order mode. Greater than 13 - enter or revise RT Bronchodilator order(s) to one equivalent RT bronchodilator order with every 4 hours Frequency and an Albuterol order with Frequency of every 2 hours PRN wheezing or increased work of breathing using Per Protocol order mode. RT to enter RT Home Evaluation for COPD & MDI Assessment order using Per Protocol order mode.     Electronically signed by Myar Chen RCP on 12/22/2021 at 12:57 PM

## 2021-12-23 ENCOUNTER — APPOINTMENT (OUTPATIENT)
Dept: GENERAL RADIOLOGY | Age: 81
DRG: 191 | End: 2021-12-23
Payer: MEDICARE

## 2021-12-23 VITALS
RESPIRATION RATE: 18 BRPM | WEIGHT: 181.5 LBS | HEIGHT: 69 IN | HEART RATE: 51 BPM | TEMPERATURE: 97.3 F | DIASTOLIC BLOOD PRESSURE: 56 MMHG | BODY MASS INDEX: 26.88 KG/M2 | OXYGEN SATURATION: 98 % | SYSTOLIC BLOOD PRESSURE: 118 MMHG

## 2021-12-23 LAB
ANION GAP SERPL CALCULATED.3IONS-SCNC: 10 MMOL/L (ref 9–17)
BUN BLDV-MCNC: 24 MG/DL (ref 8–23)
BUN/CREAT BLD: 17 (ref 9–20)
CALCIUM SERPL-MCNC: 8.4 MG/DL (ref 8.6–10.4)
CHLORIDE BLD-SCNC: 100 MMOL/L (ref 98–107)
CO2: 29 MMOL/L (ref 20–31)
CREAT SERPL-MCNC: 1.43 MG/DL (ref 0.7–1.2)
GFR AFRICAN AMERICAN: 58 ML/MIN
GFR NON-AFRICAN AMERICAN: 48 ML/MIN
GFR SERPL CREATININE-BSD FRML MDRD: ABNORMAL ML/MIN/{1.73_M2}
GFR SERPL CREATININE-BSD FRML MDRD: ABNORMAL ML/MIN/{1.73_M2}
GLUCOSE BLD-MCNC: 169 MG/DL (ref 75–110)
GLUCOSE BLD-MCNC: 179 MG/DL (ref 75–110)
GLUCOSE BLD-MCNC: 221 MG/DL (ref 70–99)
HCT VFR BLD CALC: 41.3 % (ref 40.7–50.3)
HEMOGLOBIN: 13.3 G/DL (ref 13–17)
MCH RBC QN AUTO: 31.3 PG (ref 25.2–33.5)
MCHC RBC AUTO-ENTMCNC: 32.2 G/DL (ref 28.4–34.8)
MCV RBC AUTO: 97.2 FL (ref 82.6–102.9)
NRBC AUTOMATED: 0 PER 100 WBC
PDW BLD-RTO: 12 % (ref 11.8–14.4)
PLATELET # BLD: 243 K/UL (ref 138–453)
PMV BLD AUTO: 9.9 FL (ref 8.1–13.5)
POTASSIUM SERPL-SCNC: 4 MMOL/L (ref 3.7–5.3)
RBC # BLD: 4.25 M/UL (ref 4.21–5.77)
SODIUM BLD-SCNC: 139 MMOL/L (ref 135–144)
WBC # BLD: 9.7 K/UL (ref 3.5–11.3)

## 2021-12-23 PROCEDURE — 97116 GAIT TRAINING THERAPY: CPT

## 2021-12-23 PROCEDURE — 97535 SELF CARE MNGMENT TRAINING: CPT

## 2021-12-23 PROCEDURE — 85027 COMPLETE CBC AUTOMATED: CPT

## 2021-12-23 PROCEDURE — APPSS45 APP SPLIT SHARED TIME 31-45 MINUTES: Performed by: NURSE PRACTITIONER

## 2021-12-23 PROCEDURE — 6370000000 HC RX 637 (ALT 250 FOR IP): Performed by: NURSE PRACTITIONER

## 2021-12-23 PROCEDURE — 82947 ASSAY GLUCOSE BLOOD QUANT: CPT

## 2021-12-23 PROCEDURE — 6360000002 HC RX W HCPCS: Performed by: NURSE PRACTITIONER

## 2021-12-23 PROCEDURE — 80048 BASIC METABOLIC PNL TOTAL CA: CPT

## 2021-12-23 PROCEDURE — 99239 HOSP IP/OBS DSCHRG MGMT >30: CPT | Performed by: NURSE PRACTITIONER

## 2021-12-23 PROCEDURE — 36415 COLL VENOUS BLD VENIPUNCTURE: CPT

## 2021-12-23 PROCEDURE — 97166 OT EVAL MOD COMPLEX 45 MIN: CPT

## 2021-12-23 PROCEDURE — 71045 X-RAY EXAM CHEST 1 VIEW: CPT

## 2021-12-23 PROCEDURE — 97162 PT EVAL MOD COMPLEX 30 MIN: CPT

## 2021-12-23 PROCEDURE — 2580000003 HC RX 258: Performed by: NURSE PRACTITIONER

## 2021-12-23 RX ORDER — PREDNISONE 10 MG/1
TABLET ORAL
Qty: 30 TABLET | Refills: 0 | Status: SHIPPED | OUTPATIENT
Start: 2021-12-23

## 2021-12-23 RX ORDER — ALBUTEROL SULFATE 90 UG/1
2 AEROSOL, METERED RESPIRATORY (INHALATION) EVERY 4 HOURS PRN
Qty: 54 G | Refills: 1 | Status: SHIPPED | OUTPATIENT
Start: 2021-12-23

## 2021-12-23 RX ORDER — AZITHROMYCIN 500 MG/1
500 TABLET, FILM COATED ORAL DAILY
Qty: 1 TABLET | Refills: 0 | Status: SHIPPED | OUTPATIENT
Start: 2021-12-23 | End: 2021-12-24

## 2021-12-23 RX ADMIN — GLIPIZIDE 5 MG: 5 TABLET ORAL at 06:47

## 2021-12-23 RX ADMIN — TAMSULOSIN HYDROCHLORIDE 0.4 MG: 0.4 CAPSULE ORAL at 10:14

## 2021-12-23 RX ADMIN — PENTOXIFYLLINE 400 MG: 400 TABLET, EXTENDED RELEASE ORAL at 10:40

## 2021-12-23 RX ADMIN — BUMETANIDE 1 MG: 1 TABLET ORAL at 10:15

## 2021-12-23 RX ADMIN — AZITHROMYCIN DIHYDRATE 500 MG: 500 INJECTION, POWDER, LYOPHILIZED, FOR SOLUTION INTRAVENOUS at 04:40

## 2021-12-23 RX ADMIN — PREDNISONE 40 MG: 20 TABLET ORAL at 10:14

## 2021-12-23 RX ADMIN — METOPROLOL SUCCINATE 25 MG: 25 TABLET, EXTENDED RELEASE ORAL at 10:15

## 2021-12-23 NOTE — FLOWSHEET NOTE
Patient in COVID-19 isolation; receives Sacrament of the [address] (anointing) remotely from [facility].    [facility] will follow as needed. (writer charting for [employer] .)     95/21/93 0827   Encounter Summary   Services provided to: Patient   Referral/Consult From: Rounding   Continue Visiting   (12/23/21 COVID/anointed)   Complexity of Encounter Low   Length of Encounter 15 minutes   Routine   Type Initial   Assessment Unable to respond   Sacraments   Sacrament of Sick-Anointing Anointed  (12/23/21 Fr. Jason De La Paz)

## 2021-12-23 NOTE — PROGRESS NOTES
Physical Therapy    Facility/Department: [facility] PROGRESSIVE CARE  Initial Assessment    NAME: Neil Vasquez  : 1940  MRN: [id number]    Date of Service: 2021    Discharge Recommendations:  Patient would benefit from continued therapy after discharge     Due to recent hospitalization and medical condition, pt would benefit from additional therapy at time of discharge to ensure safety. Please refer to the AM-PAC score for current functional status. HPI (per chart): Neil Vasquez is a [de-identified] y.o. male who was admitted for the management of  Chronic obstructive pulmonary disease ([facility] [address] , presented to ER with Fever and Fatigue      - Patient reports to the emergency department at the insistence of his family. Sanju Davis has a personal history of COPD with recurrent bacterial pneumonia.  Patient has been having fever and chills for the past 24 to 36 hours as well as exertional dyspnea and weakness.  Family insisted that patient be evaluated in the emergency department where he was found to be hypoxic with a sat of 88%.  Patient's chest x-ray is unremarkable for bacterial consolidation.  A CTA was completed to rule out PE which was negative.  Patient's Covid swab is negative as well.  Patient is highly motivated for discharge and is quite annoyed that his family for bringing him to the emergency department. Sanju Davis continues to require supplemental oxygen to maintain his sats greater than 93%.  Patient has no complaints at the time my exam.    - Significant improvement. Patient has no complaints. Lung sounds improved. Patient is no longer requiring supplemental oxygen. Patient is treated for opportunistic viral infection and COPD exacerbation and has responded very well. Patient will be discharged on oral regiment today. Assessment   Body structures, Functions, Activity limitations: Decreased functional mobility ; Decreased endurance  Assessment: Pateint demo dedreased endurance and balance height  Bathroom Equipment: Grab bars in shower  Home Equipment: Rolling walker,Cane  ADL Assistance: Independent  Homemaking Assistance: Independent  Ambulation Assistance: Independent (no AD typically)  Transfer Assistance: Independent  Active : Yes  Occupation: Part time employment  Type of occupation: construction  Leisure & Hobbies: likes to stay busy, yard work  Additional Comments: no falls  Cognition   Cognition  Overall Cognitive Status: WNL    Objective     Observation/Palpation  Observation: SP02 96% at rest in bed on room air, 99% after ambulating in room    AROM RLE (degrees)  RLE AROM: WFL  AROM LLE (degrees)  LLE AROM : WFL  Strength RLE  Comment: 5/5  Strength LLE  Comment: 5/5  Tone RLE  RLE Tone: Normotonic  Tone LLE  LLE Tone: Normotonic  Motor Control  Gross Motor?: WNL     Bed mobility  Rolling to Left: Independent  Rolling to Right: Independent  Supine to Sit: Independent  Sit to Supine: Independent  Scooting: Independent  Transfers  Sit to Stand: Independent  Stand to sit: Independent  Bed to Chair: Independent  Stand Pivot Transfers: Independent  Ambulation  Ambulation?: Yes  Ambulation 1  Surface: level tile  Device: No Device  Assistance: Supervision  Quality of Gait: steady gait, only required assist with IV pole  Gait Deviations: Slow Alis  Distance: 80 feet     Balance  Sitting - Static: Good  Sitting - Dynamic: Good  Standing - Static: Good;-  Standing - Dynamic: Good;-        Plan   Plan  Times per week: 1x/d, 5-6 d/wk  Current Treatment Recommendations: Balance Training,Functional Mobility Training,Gait Training,Endurance Training,Safety Education & Training,Home Exercise Program,Patient/Caregiver Education & Training  Safety Devices  Type of devices:  All fall risk precautions in place,Gait belt,Nurse notified,Left in chair,Call light within reach    G-Code       OutComes Score                                                  AM-PAC Score  AM-PAC Inpatient Mobility Raw Score : 22 (12/23/21 [facility])  AM-PAC Inpatient T-Scale Score : 53.28 (12/23/21 1235)  Mobility Inpatient CMS 0-100% Score: 20.91 (12/23/21 [facility])  Mobility Inpatient CMS G-Code Modifier : CJ (12/23/21 1235)          Goals  Short term goals  Time Frame for Short term goals: 12 visits  Short term goal 1: Patient will amb 200 feet indep. Short term goal 2: Patient will have good dynamic standing balance. Short term goal 3: Patient will tolerate 30 minutes of ther-ex and ther-act.   Patient Goals   Patient goals : Return home       Therapy Time   Individual Concurrent Group Co-treatment   Time In 0942         Time Out 1017         Minutes 35         Timed Code Treatment Minutes: Shanique 89, PT

## 2021-12-23 NOTE — PLAN OF CARE
Problem: Skin Integrity:  Goal: Will show no infection signs and symptoms  Description: Will show no infection signs and symptoms  Outcome: Completed  Goal: Absence of new skin breakdown  Description: Absence of new skin breakdown  Outcome: Completed     Problem: Falls - Risk of:  Goal: Will remain free from falls  Description: Will remain free from falls  12/23/2021 1139 by Dalton Brush RN  Outcome: Completed  12/23/2021 1024 by Dalton Brush RN  Outcome: Ongoing  Note: Patient remains free from falls and injuries. Call light with in reach and patient close to nurse station. Will continue to round hourly and more often as needed.      Goal: Absence of physical injury  Description: Absence of physical injury  Outcome: Completed     Problem: Discharge Planning:  Goal: Discharged to appropriate level of care  Description: Discharged to appropriate level of care  Outcome: Completed  Goal: Participates in care planning  Description: Participates in care planning  Outcome: Completed     Problem: Airway Clearance - Ineffective:  Goal: Clear lung sounds  Description: Clear lung sounds  12/23/2021 1139 by Dalton Brush RN  Outcome: Completed  12/23/2021 0003 by Mckinley Armstrong RN  Outcome: Ongoing  Goal: Ability to maintain a clear airway will improve  Description: Ability to maintain a clear airway will improve  12/23/2021 1139 by Dalton Brush RN  Outcome: Completed  12/23/2021 0003 by Mckinley Armstrong RN  Outcome: Ongoing     Problem: Fluid Volume - Deficit:  Goal: Achieves intake and output within specified parameters  Description: Achieves intake and output within specified parameters  Outcome: Completed     Problem: Gas Exchange - Impaired:  Goal: Levels of oxygenation will improve  Description: Levels of oxygenation will improve  12/23/2021 1139 by Dalton Brush RN  Outcome: Completed  12/23/2021 0003 by Mckinley Armstrong RN  Outcome: Ongoing     Problem: Hyperthermia:  Goal: Ability to maintain a body temperature in the normal range will improve  Description: Ability to maintain a body temperature in the normal range will improve  12/23/2021 1139 by Jose Blandon RN  Outcome: Completed  12/23/2021 0003 by Neo Davidson RN  Outcome: Ongoing     Problem: Tobacco Use:  Goal: Will participate in inpatient tobacco-use cessation counseling  Description: Will participate in inpatient tobacco-use cessation counseling  Outcome: Completed

## 2021-12-23 NOTE — CARE COORDINATION
Case Management Initial Discharge Plan  [facility],         Readmission Risk              Risk of Unplanned Readmission:  18             Met with:patient to discuss discharge plans. Information verified: address, contacts, phone number, , insurance Yes  PCP: Lee Collier MD  Date of last visit: July     Insurance Provider: Laura Becerra and [facility]     Discharge Planning  Current Residence:  Private home   Living Arrangements:    spouse       Home has 1 stories/1 stairs to climb  Support Systems:   spouse and three sons       Current Services PTA:  None   Agency: none      Patient able to perform ADL's:Independent  DME in home: cane and walker   DME used to aid ambulation prior to admission:   None   DME used during admission:  None     Potential Assistance Needed:   none     Pharmacy: [facility] [address]    Potential Assistance Purchasing Medications:   no  Does patient want to participate in local refill/ meds to beds program?    no     Patient agreeable to home care: No  [place] of choice provided:  no      Type of Home Care Services:    na   Patient expects to be discharged to:   home     Prior SNF/Rehab Placement and Facility:  [facility]   Agreeable to SNF/Rehab: No  [place] of choice provided: n/a   Evaluation: n/a    Expected Discharge date:      Follow Up Appointment: Best Day/ Time:  any     Transportation provider: [place]   Transportation arrangements needed for discharge: No    Discharge Plan:   Met with patient to complete discharge assessment. COVID has been ruled out and is now up and walking with therapy on [facility]. He is independent and drives. He has cane and walker from prior hospital stay. Patient was hospitalized 5 years ago and was sent to [facility]. He also had home care but has not needed anything since than. Patient likely to go home today, no needs.      Electronically signed by Natalya Watkins RN on 21 at 1:21 PM EST

## 2021-12-23 NOTE — DISCHARGE SUMMARY
[facility]  Office: [address], DO, Js Marivel, DO, Chai Holes, DO, Myra Viera Blood, DO, Jairo Kidd MD, Andreina Campbell MD, Lauern Barriga MD, Ananya Marcum MD, Farshad Patrick MD, Graeme Yoon MD, Brittnee Mariee MD, Don Collazo, DO, Fernanda Rosa, DO, Dain Desai MD,  Ying Vu, DO, Guy Dash MD, Juan Thornton MD, Nelly Lombardo MD, Jane Castaneda MD, Manpreet Mayfield MD, Han Moulton MD, Lizzette Bansal MD, Concetta Goldman [facility], [facility], CNP, Rebecca Cohen, CNP, Judie Nguyen, CNS, Jakub Mc, CNP, Hoa Singh, CNP, Cathy Hinojosa, CNP, Shivani Montenegro, CNP, Tucker Cheng, CNP, Sirisha Nelson PANIXON, Kyra Lucas, DNP, Sabiha Rodriguez, DNP, Omega Adamson, CNP, Phoebe Cartagena, CNP, Severiano Spalding, CNP, Augie Gresham, CNP, Dean Richards, CNP, [facility]    Discharge Summary     Patient ID: Mak Sparks  :  1940   MRN: [id number]     ACCOUNT:  [de-identified]   Patient's PCP: Tracie Ortiz MD  Admit Date: 2021   Discharge Date: 2021     Length of Stay: 1  Code Status:  Full Code  Admitting Physician: Farshad Patrick MD  Discharge Physician: PEDRO Pozo - NP     Active Discharge Diagnoses:     Hospital Problem Lists:  Principal Problem:    Chronic obstructive pulmonary disease [facility])  Active Problems:    Hypoxia    Benign hypertension    Chronic systolic congestive heart failure ([facility] [address]    Coronary atherosclerosis    Diabetes mellitus with [facility]    Hyperlipidemia    Ischemic cardiomyopathy    Type 2 diabetes mellitus without complication (HCC)    Paroxysmal atrial fibrillation ([facility] [address]  Resolved Problems:    * No resolved hospital problems.  *      Admission Condition:  fair     Discharged Condition: good    Hospital Stay:     Hospital Course:  Mak Sparks is a [de-identified] y.o. male who was admitted for the management of  Chronic obstructive pulmonary disease ([facility] [address] , presented to ER with Fever and Fatigue    12/22 - Patient reports to the emergency department at the insistence of his family. Patient has a personal history of COPD with recurrent bacterial pneumonia. Patient has been having fever and chills for the past 24 to 36 hours as well as exertional dyspnea and weakness. Family insisted that patient be evaluated in the emergency department where he was found to be hypoxic with a sat of 88%. Patient's chest x-ray is unremarkable for bacterial consolidation. A CTA was completed to rule out PE which was negative. Patient's Covid swab is negative as well. Patient is highly motivated for discharge and is quite annoyed that his family for bringing him to the emergency department. Patient continues to require supplemental oxygen to maintain his sats greater than 93%. Patient has no complaints at the time my exam.    12/23 - Significant improvement. Patient has no complaints. Lung sounds improved. Patient is no longer requiring supplemental oxygen. Patient is treated for opportunistic viral infection and COPD exacerbation and has responded very well. Patient will be discharged on oral regiment today.       Significant therapeutic interventions: as above    Significant Diagnostic Studies:   Labs / Micro:  CBC:   Lab Results   Component Value Date    WBC 9.7 12/23/2021    RBC 4.25 12/23/2021    HGB 13.3 12/23/2021    HCT 41.3 12/23/2021    MCV 97.2 12/23/2021    MCH 31.3 12/23/2021    MCHC 32.2 12/23/2021    RDW 12.0 12/23/2021     12/23/2021     BMP:    Lab Results   Component Value Date    GLUCOSE 221 12/23/2021     12/23/2021    K 4.0 12/23/2021     12/23/2021    CO2 29 12/23/2021    ANIONGAP 10 12/23/2021    BUN 24 12/23/2021    CREATININE 1.43 12/23/2021    BUNCRER 17 12/23/2021    CALCIUM 8.4 12/23/2021    LABGLOM 48 12/23/2021    GFRAA 58 12/23/2021    GFR      12/23/2021    GFR NOT REPORTED 12/23/2021 inhaler  Commonly known as: Atrovent HFA  Inhale 1 puff into the lungs 3 times daily     predniSONE 10 MG tablet  Commonly known as: DELTASONE  Take 20 mg twice a day for 3 days, take 20 mg in the morning and 10 mg at night for 3 days, take 10 mg twice a day for 3 days, take 10 mg only in the morning for 3 days, then stop        CONTINUE taking these medications    atorvastatin 10 MG tablet  Commonly known as: LIPITOR     bumetanide 1 MG tablet  Commonly known as: BUMEX     famotidine 20 MG tablet  Commonly known as: PEPCID     glimepiride 2 MG tablet  Commonly known as: AMARYL     magnesium oxide 400 MG tablet  Commonly known as: MAG-OX     metoprolol succinate 25 MG extended release tablet  Commonly known as: TOPROL XL     pentoxifylline 400 MG extended release tablet  Commonly known as: TRENTAL     tamsulosin 0.4 MG capsule  Commonly known as: FLOMAX     Xarelto 20 MG Tabs tablet  Generic drug: rivaroxaban           Where to Get Your Medications      These medications were sent to [facility] [address], [address] [facility] [phone] - F [phone]  [address] [place] [id number]    Phone: [phone]   · albuterol sulfate  (90 Base) MCG/ACT inhaler  · azithromycin 500 MG tablet  · ipratropium 17 MCG/ACT inhaler     You can get these medications from any pharmacy    Bring a paper prescription for each of these medications  · predniSONE 10 MG tablet         No discharge procedures on file. Time Spent on discharge is  45 mins in patient examination, evaluation, counseling as well as medication reconciliation, prescriptions for required medications, discharge plan and follow up. Electronically signed by   PEDRO Garcia NP  12/23/2021  10:32 AM      Thank you Dr. Khadra Salgado MD for the opportunity to be involved in this patient's care.

## 2021-12-23 NOTE — ACP (ADVANCE CARE PLANNING)
Advance Care Planning     Advance Care Planning Activator (Inpatient)  Conversation Note      Date of ACP Conversation: 12/23/2021     Conversation Conducted with: Patient with Decision Making Capacity    ACP Activator: Natalya Watkins RN        Health Care Decision Maker:     Current Designated Health Care Decision Maker:     Click here to complete Healthcare Decision Makers including section of the Healthcare Decision Maker Relationship (ie \"Primary\")  Today we documented Decision Maker(s) consistent with Legal Next of Kin hierarchy. Care Preferences    Ventilation: \"If you were in your present state of health and suddenly became very ill and were unable to breathe on your own, what would your preference be about the use of a ventilator (breathing machine) if it were available to you? \"      Would the patient desire the use of ventilator (breathing machine)?: yes    \"If your health worsens and it becomes clear that your chance of recovery is unlikely, what would your preference be about the use of a ventilator (breathing machine) if it were available to you? \"     Would the patient desire the use of ventilator (breathing machine)?: Yes      Resuscitation  \"CPR works best to restart the heart when there is a sudden event, like a heart attack, in someone who is otherwise healthy. Unfortunately, CPR does not typically restart the heart for people who have serious health conditions or who are very sick. \"    \"In the event your heart stopped as a result of an underlying serious health condition, would you want attempts to be made to restart your heart (answer \"yes\" for attempt to resuscitate) or would you prefer a natural death (answer \"no\" for do not attempt to resuscitate)? \" yes       [x] Yes   [] No   Educated Lewis / Jerrod Nino regarding differences between Advance Directives and portable DNR orders.     Length of ACP Conversation in minutes:      Conversation Outcomes:  [x] ACP discussion completed  [] Existing advance directive reviewed with patient; no changes to patient's previously recorded wishes  [] New Advance Directive completed  [] Portable Do Not Rescitate prepared for Provider review and signature  [] POLST/POST/MOLST/MOST prepared for Provider review and signature      Follow-up plan:    [] Schedule follow-up conversation to continue planning  [] Referred individual to Provider for additional questions/concerns   [] Advised patient/agent/surrogate to review completed ACP document and update if needed with changes in condition, patient preferences or care setting    [x] This note routed to one or more involved healthcare providers

## 2021-12-23 NOTE — PROGRESS NOTES
Occupational Therapy   Occupational Therapy Initial Assessment  Date: 2021   Patient Name: Dain No  MRN: [id number]     : 1940    Date of Service: 2021       RN reports patient is medically stable for therapy treatment this date. Chart reviewed prior to treatment and patient is agreeable for therapy. All lines intact and patient positioned comfortably at end of treatment. All patient needs addressed prior to ending therapy session. Assessment   Assessment: No further OT treatment warranted as pt is independent. Prognosis: Good  Decision Making: Medium Complexity  OT Education: OT Role;Energy Conservation  No Skilled OT: Safe to return home  REQUIRES OT FOLLOW UP: No  Activity Tolerance  Activity Tolerance: Patient Tolerated treatment well  Safety Devices  Safety Devices in place: Yes  Type of devices: Call light within reach; Left in chair;Gait belt           Patient Diagnosis(es): The primary encounter diagnosis was Pneumonia due to infectious organism, unspecified laterality, unspecified part of lung. A diagnosis of Hypoxia was also pertinent to this visit. has a past medical history of CAD (coronary artery disease), CHF (congestive heart failure) ([facility] [address] Diabetes mellitus ([facility] [address] and Hypertension. has no past surgical history on file.            Restrictions  Restrictions/Precautions  Restrictions/Precautions: General Precautions    Subjective   General  Chart Reviewed: Yes  Patient assessed for rehabilitation services?: Yes  Family / Caregiver Present: No  Patient Currently in Pain: No  Vital Signs  Temp: 97.3 °F (36.3 °C)  Temp Source: Oral  Pulse: 51  Heart Rate Source: Monitor  Resp: 18  BP: (!) 118/56  BP Location: Right upper arm  MAP (mmHg): 74  Patient Position: Sitting  Patient Currently in Pain: No  Oxygen Therapy  SpO2: 98 %  O2 Device: None (Room air)  Social/Functional History  Social/Functional History  Lives With: Spouse  Type of Home: House  Home Layout: One level  Home Access: Stairs to enter without rails  Entrance Stairs - Number of Steps: 1  Bathroom Shower/Tub: Tub/Shower unit  Bathroom Toilet: Handicap height  Bathroom Equipment: Grab bars in shower  Home Equipment: Rolling walker,Cane  ADL Assistance: Independent  Homemaking Assistance: Independent  Ambulation Assistance: Independent (no AD typically)  Transfer Assistance: Independent  Active : Yes  Occupation: Part time employment  Type of occupation: construction  Leisure & Hobbies: likes to stay busy, yard work  Additional Comments: no falls       Objective   Vision: Impaired  Vision Exceptions: Wears glasses for reading  Hearing: Exceptions to [facility]  Hearing Exceptions: Hard of hearing/hearing concerns    Orientation  Overall Orientation Status: Within Functional Limits  Observation/Palpation  Posture: Good  Observation: SP02 96% at rest in bed on room air, 99% after ambulating in room  Balance  Sitting Balance: Independent  Standing Balance: Independent  Functional Mobility  Functional - Mobility Device: No device  Activity: To/from bathroom  Assist Level: Independent  Functional Mobility Comments: Pt demo's good independence with mob in room, good safety awareness and pacing. O2 sats maintained and no SOB noted  Toilet Transfers  Equipment Used: Standard toilet  Toilet Transfer: Independent  ADL  Feeding: Independent  Grooming: Independent  UE Bathing: Independent  LE Bathing: Independent  UE Dressing: Independent  LE Dressing: Independent  Toileting: Independent  Additional Comments: O2 sats in upper 90's with mob/ADL completion in bathroom. 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))        LUE AROM (degrees)  LUE AROM : WFL  RUE AROM (degrees)  RUE AROM : WFL  LUE Strength  Gross LUE Strength: WFL  LUE Strength Comment: B UE's 5/5  RUE Strength  Gross RUE Strength: WFL                   Plan   Plan  Times per week: 1 visit with gabriela  Current Treatment Recommendations: Equipment Evaluation, Education, & procurement,Endurance Training       AM-[facility] Inpatient Daily Activity Raw Score: 24 (12/23/21 [facility])  AM-PAC Inpatient ADL T-Scale Score : 57.54 (12/23/21 [facility])  ADL Inpatient CMS 0-100% Score: 0 (12/23/21 [facility])  ADL Inpatient CMS G-Code Modifier : [address] (12/23/21 [facility])    Goals  Short term goals  Time Frame for Short term goals: 1 visit with gabriela  Short term goal 1: pt will demo and verb good understanding of pacing techs, general safety for ADL completion/mob  Patient Goals   Patient goals : to go home       Therapy Time   Individual Concurrent Group Co-treatment   Time In 1021         Time Out 1058         Minutes 37          treatment min: Andrea Story OT

## 2021-12-23 NOTE — PLAN OF CARE
Problem: Airway Clearance - Ineffective:  Goal: Clear lung sounds  Description: Clear lung sounds  Outcome: Ongoing     Problem: Airway Clearance - Ineffective:  Goal: Ability to maintain a clear airway will improve  Description: Ability to maintain a clear airway will improve  Outcome: Ongoing     Problem: Gas Exchange - Impaired:  Goal: Levels of oxygenation will improve  Description: Levels of oxygenation will improve  Outcome: Ongoing     Problem: Hyperthermia:  Goal: Ability to maintain a body temperature in the normal range will improve  Description: Ability to maintain a body temperature in the normal range will improve  Outcome: Ongoing

## 2021-12-23 NOTE — PROGRESS NOTES
[facility]  Office: [address], DO, La Nenaviviane Tucker, DO, Kathypatricia Arreguin, DO, Homero Saez, DO, Babs Unger MD, Anthony Dey MD, Milla Maza MD, Amanda Encarnacion MD, Jaylyn Anderson MD, Cecilia London MD, Noel Agrawal MD, Mayra Rosales, DO, Tomer Biggs, DO, Yrn Can MD,  Ester John, DO, Ki Castelan MD, Dolores Tang MD, Nadeem Shelton MD, Renny Benavides MD, Ky Diggs MD, Maximo Zamudio MD, Pari Davies MD, Alferd Felty, CNP, [facility], CNP, Nena Resendiz, CNP, Yuli Vicente, CNS, Mau Sykes, CNP, Monica Mccormack, CNP, Radha Modi, CNP, Ethyl Mercury, CNP, Mignon Simon, [facility], Maykel Lorenz PA-C, Vee Sr, DNP, Ayo Rocha, [facility], Danitza Raymond, CNP, Elia Lynne, CNP, Marleny Alves, CNP, Rebeca Bingham, CNP, Ana Saldana, [facility], [facility]    Progress Note    12/23/2021    10:19 AM    Name:   Tl Cox  MRN:     [id number]     Acct:      [de-identified]   Room:   [address] [place] [address] Day:  1  Admit Date:  12/21/2021  8:09 PM    PCP:   Khadra Salgado MD  Code Status:  Full Code    Subjective:     C/C:   Chief Complaint   Patient presents with   Aylin Se Fever    Fatigue     Interval History Status: significantly improved. Significant improvement. Patient has no complaints. Lung sounds improved. Patient is no longer requiring supplemental oxygen. Patient is treated for opportunistic viral infection and COPD exacerbation and has responded very well. Patient will be discharged on oral regiment today. Brief History:     12/22 - Patient reports to the emergency department at the insistence of his family. Patient has a personal history of COPD with recurrent bacterial pneumonia. Patient has been having fever and chills for the past 24 to 36 hours as well as exertional dyspnea and weakness.   Family insisted that patient be evaluated in the emergency department where he was found to be hypoxic with a sat of 88%. Patient's chest x-ray is unremarkable for bacterial consolidation. A CTA was completed to rule out PE which was negative. Patient's Covid swab is negative as well. Patient is highly motivated for discharge and is quite annoyed that his family for bringing him to the emergency department. Patient continues to require supplemental oxygen to maintain his sats greater than 93%. Patient has no complaints at the time my exam.    12/23 - Significant improvement. Patient has no complaints. Lung sounds improved. Patient is no longer requiring supplemental oxygen. Patient is treated for opportunistic viral infection and COPD exacerbation and has responded very well. Patient will be discharged on oral regiment today. Review of Systems:     Constitutional:  negative for chills, fevers, sweats  Respiratory:  negative for cough, dyspnea on exertion, shortness of breath, wheezing  Cardiovascular:  negative for chest pain, chest pressure/discomfort, lower extremity edema, palpitations  Gastrointestinal:  negative for abdominal pain, constipation, diarrhea, nausea, vomiting  Neurological:  negative for dizziness, headache    Medications: Allergies:     Allergies   Allergen Reactions    Acetaminophen-Codeine      \"shakes\"    Benadryl [Diphenhydramine]      Unsure if this is an accurate allergy    Codeine      unknown    Hydrocodone-Acetaminophen     Meperidine      Other reaction(s): Facial Swelling    Morpholine Salicylate      Sick\"    Penicillins Hives    Quinapril Swelling       Current Meds:   Scheduled Meds:    azithromycin  500 mg IntraVENous Q24H    sodium chloride flush  5-40 mL IntraVENous 2 times per day    predniSONE  40 mg Oral Daily    atorvastatin  10 mg Oral Nightly    bumetanide  1 mg Oral Daily    famotidine  20 mg Oral Nightly    glipiZIDE  5 mg Oral QAM AC    metoprolol succinate  25 mg Oral Daily    pentoxifylline  400 mg Oral BID    rivaroxaban  20 mg Oral Dinner    tamsulosin  0.4 mg Oral BID     Continuous Infusions:    sodium chloride 75 mL/hr at 21    sodium chloride      dextrose       PRN Meds: sodium chloride, acetaminophen **OR** acetaminophen, albuterol, ondansetron **OR** ondansetron, polyethylene glycol, sodium chloride flush, glucose, dextrose, glucagon (rDNA), dextrose    Data:     Past Medical History:   has a past medical history of CAD (coronary artery disease), CHF (congestive heart failure) ([facility] Diabetes mellitus [facility] and Hypertension. Social History:   reports that he has been smoking. He has never used smokeless tobacco. He reports previous alcohol use. He reports that he does not use drugs. Family History: History reviewed. No pertinent family history. Vitals:  BP (!) 115/55   Pulse 62   Temp 97.3 °F (36.3 °C) (Oral)   Resp 18   Ht 5' 9\" (1.753 m)   Wt 181 lb 8 oz (82.3 kg)   SpO2 96%   BMI 26.80 kg/m²   Temp (24hrs), Av.6 °F (36.4 °C), Min:97.2 °F (36.2 °C), Max:98.6 °F (37 °C)    Recent Labs     21  0647   POCGLU 179*       I/O (24Hr):     Intake/Output Summary (Last 24 hours) at 2021 1019  Last data filed at 2021 1437  Gross per 24 hour   Intake 250 ml   Output 400 ml   Net -150 ml       Labs:  Hematology:  Recent Labs     21  0642   WBC 7.8 9.7   RBC 4.13* 4.25   HGB 13.5 13.3   HCT 39.6* 41.3   MCV 95.8 97.2   MCH 32.5 31.3   MCHC 34.0 32.2   RDW 12.4* 12.0    243   MPV 8.0 9.9   INR 1.3  --      Chemistry:  Recent Labs     21  0642    139   K 4.1 4.0   CL 98 100   CO2 30 29   GLUCOSE 110* 221*   BUN 19 24*   CREATININE 1.40* 1.43*   ANIONGAP 8* 10   LABGLOM 49* 48*   GFRAA 59* 58*   CALCIUM 9.4 8.4*   PROBNP 329*  --    TROPHS 19  --      Recent Labs     21  2150 21  0647   PROT 6.1*  --    LABALBU 4.3  --    AST 17  --    ALT 16  --    ALKPHOS 76  --    BILITOT 0.70  --    LIPASE 16  -- POCGLU  --  179*     ABG:No results found for: POCPH, PHART, PH, POCPCO2, BOJ6VWG, PCO2, POCPO2, PO2ART, PO2, POCHCO3, CWM6YWU, HCO3, NBEA, PBEA, BEART, BE, THGBART, THB, CZL5THJ, EIMH2EKT, C2SCAFXW, O2SAT, FIO2  Lab Results   Component Value Date/Time    SPECIAL LT F A 20ML 12/21/2021 09:56 PM     Lab Results   Component Value Date/Time    CULTURE NO GROWTH 1 DAY 12/21/2021 09:56 PM       Radiology:  XR CHEST PORTABLE    Result Date: 12/23/2021  Minimal bibasilar airspace disease most likely representing atelectasis. No significant interval change. XR CHEST PORTABLE    Result Date: 12/21/2021  Irregular density projecting over the left lung base. While this may be projectional, the presence of an underlying pulmonary nodule is not excluded. This could be further assessed with CT. CT CHEST PULMONARY EMBOLISM W CONTRAST    Result Date: 12/22/2021  No acute finding in the chest with no evidence of pulmonary embolism.  RECOMMENDATIONS: Unavailable       Physical Examination:        General appearance:  alert, cooperative and no distress  Mental Status:  oriented to person, place and time and normal affect  Lungs:  clear to auscultation bilaterally, normal effort  Heart:  regular rate and rhythm, no murmur  Abdomen:  soft, nontender, nondistended, normal bowel sounds, no masses, hepatomegaly, splenomegaly  Extremities:  no edema, redness, tenderness in the calves  Skin:  no gross lesions, rashes, induration    Assessment:        Hospital Problems           Last Modified POA    Hypoxia 12/22/2021 Yes    Chronic obstructive pulmonary disease ([address] 12/22/2021 Yes    Benign hypertension 12/22/2021 Yes    Chronic systolic congestive heart failure ([address] 12/22/2021 Yes    Coronary atherosclerosis 12/22/2021 Yes    Diabetes mellitus with peripheral angiopathy without gangrene ([address] 12/22/2021 Yes    Hyperlipidemia 12/22/2021 Yes    Ischemic cardiomyopathy 12/22/2021 Yes    Type 2 diabetes mellitus without complication ([facility] 09/86/9016 Yes    Paroxysmal atrial fibrillation ([facility] 12/22/2021 Yes          Plan:        1. COPD exacerbation  1. Complete 3-day course of azithromycin  2. Oral steroids  3. Inhalers  4.  Discharge      PEDRO Mancini NP  12/23/2021  10:19 AM

## 2021-12-27 LAB
CULTURE: NORMAL
CULTURE: NORMAL
Lab: NORMAL
Lab: NORMAL
SPECIMEN DESCRIPTION: NORMAL
SPECIMEN DESCRIPTION: NORMAL

## 2022-02-06 ENCOUNTER — HOSPITAL ENCOUNTER (EMERGENCY)
Facility: CLINIC | Age: 82
Discharge: HOME | End: 2022-02-07
Attending: EMERGENCY MEDICINE
Payer: MEDICARE

## 2022-02-06 ENCOUNTER — APPOINTMENT (OUTPATIENT)
Dept: ULTRASOUND IMAGING | Facility: CLINIC | Age: 82
End: 2022-02-06
Payer: MEDICARE

## 2022-02-06 VITALS
BODY MASS INDEX: 26.81 KG/M2 | SYSTOLIC BLOOD PRESSURE: 123 MMHG | DIASTOLIC BLOOD PRESSURE: 71 MMHG | TEMPERATURE: 97.9 F | RESPIRATION RATE: 18 BRPM | WEIGHT: 181 LBS | HEIGHT: 69 IN | HEART RATE: 60 BPM | OXYGEN SATURATION: 96 %

## 2022-02-06 DIAGNOSIS — R60.9 PERIPHERAL EDEMA: ICD-10-CM

## 2022-02-06 DIAGNOSIS — M79.89 LEFT LEG SWELLING: Primary | ICD-10-CM

## 2022-02-06 PROCEDURE — 93971 EXTREMITY STUDY: CPT

## 2022-02-06 PROCEDURE — 99284 EMERGENCY DEPT VISIT MOD MDM: CPT

## 2022-02-06 ASSESSMENT — ENCOUNTER SYMPTOMS
SHORTNESS OF BREATH: 0
COUGH: 0
VOMITING: 0
WHEEZING: 0
EYE DISCHARGE: 0
NAUSEA: 0
ABDOMINAL PAIN: 0
CONSTIPATION: 0
EYE REDNESS: 0
EYE PAIN: 0
SORE THROAT: 0
COLOR CHANGE: 0
DIARRHEA: 0
STRIDOR: 0

## 2022-02-07 NOTE — ED PROVIDER NOTES
[address] ED  EMERGENCY DEPARTMENT ENCOUNTER      Pt Name: Danis Mackey  MRN: [id number]  [place] 1940  Date of evaluation: 2/6/2022  Provider: Nichol Mcqueen MD    [address] [place] [address]       Chief Complaint   Patient presents with    Leg Swelling     patient states left leg swelling started a week or so ago then came back Friday. HISTORY OF PRESENT ILLNESS  (Location/Symptom, Timing/Onset, Context/Setting, Quality, Duration, Modifying Factors, Severity.)   Danis Mackey is a 81 y.o. male who presents to the emergency department complaining of left leg swelling that started a week ago. He relates it seemed to go away but today has come back with a vengeance. He is concerned about a blood clot. He does take Xarelto for his cardiac stent. He denies any chest pain or shortness of breath. He is not really noticed any warmth to the leg. He did have an episode like this about 7 years ago or so he tells me and it was not a blood clot at that time but it did seem to get better after a while. Nursing Notes were reviewed. REVIEW OF SYSTEMS    (2-9 systems for level 4, 10 or more for level 5)     Review of Systems   Constitutional: Negative for activity change, appetite change, chills, fatigue and fever. HENT: Negative for congestion, ear pain and sore throat. Eyes: Negative for pain, discharge and redness. Respiratory: Negative for cough, shortness of breath, wheezing and stridor. Cardiovascular: Negative for chest pain. Gastrointestinal: Negative for abdominal pain, constipation, diarrhea, nausea and vomiting. Genitourinary: Negative for decreased urine volume and difficulty urinating. Musculoskeletal: Negative for arthralgias and myalgias. Left leg swelling   Skin: Negative for color change and rash. Neurological: Negative for dizziness, weakness and headaches. Psychiatric/Behavioral: Negative for behavioral problems and confusion.         Except as noted above the remainder of the review of systems was reviewed and negative. PAST MEDICAL HISTORY     Past Medical History:   Diagnosis Date    CAD (coronary artery disease)     [facility]     Diabetes mellitus ([facility]     Hypertension        SURGICAL HISTORY     History reviewed. No pertinent surgical history. CURRENT MEDICATIONS       Previous Medications    ALBUTEROL SULFATE HFA (VENTOLIN HFA) 108 (90 BASE) MCG/ACT INHALER    Inhale 2 puffs into the lungs every 4 hours as needed for Wheezing    ATORVASTATIN (LIPITOR) 10 MG TABLET    Take 10 mg by mouth nightly    BUMETANIDE (BUMEX) 1 MG TABLET    Take 1 mg by mouth daily    FAMOTIDINE (PEPCID) 20 MG TABLET    Take 20 mg by mouth nightly    GLIMEPIRIDE (AMARYL) 2 MG TABLET    Take 2 mg by mouth every morning (before breakfast)    IPRATROPIUM (ATROVENT HFA) 17 MCG/ACT INHALER    Inhale 1 puff into the lungs 3 times daily    MAGNESIUM OXIDE (MAG-OX) 400 MG TABLET    Take 400 mg by mouth daily     METOPROLOL SUCCINATE (TOPROL XL) 25 MG EXTENDED RELEASE TABLET    Take 25 mg by mouth daily    PENTOXIFYLLINE (TRENTAL) 400 MG EXTENDED RELEASE TABLET    Take 400 mg by mouth 2 times daily     PREDNISONE (DELTASONE) 10 MG TABLET    Take 20 mg twice a day for 3 days, take 20 mg in the morning and 10 mg at night for 3 days, take 10 mg twice a day for 3 days, take 10 mg only in the morning for 3 days, then stop    RIVAROXABAN (XARELTO) 20 MG TABS TABLET    Take 20 mg by mouth daily    TAMSULOSIN (FLOMAX) 0.4 MG CAPSULE    Take 0.4 mg by mouth 2 times daily        ALLERGIES     Acetaminophen-codeine, Benadryl [diphenhydramine], Codeine, Hydrocodone-acetaminophen, Meperidine, Morpholine salicylate, Penicillins, and Quinapril    FAMILY HISTORY     History reviewed. No pertinent family history. No family status information on file. SOCIAL HISTORY      reports that he has been smoking.  He has never used smokeless tobacco. He reports previous alcohol use. He reports that he does not use drugs. PHYSICAL EXAM    (up to 7 for level 4, 8 or more for level 5)     ED Triage Vitals [02/06/22 2050]   BP Temp Temp Source Pulse Resp SpO2 Height Weight   123/71 97.9 °F (36.6 °C) Oral 60 18 96 % 5' 9\" (1.753 m) 181 lb (82.1 kg)     Physical Exam  Vitals and nursing note reviewed. Constitutional:       General: He is not in acute distress. Appearance: He is well-developed. He is not ill-appearing, toxic-appearing or diaphoretic. HENT:      Head: Normocephalic and atraumatic. Right Ear: External ear normal.      Left Ear: External ear normal.      Nose: Nose normal.      Mouth/Throat:      Mouth: Mucous membranes are moist.   Eyes:      General:         Right eye: No discharge. Left eye: No discharge. Conjunctiva/sclera: Conjunctivae normal.      Pupils: Pupils are equal, round, and reactive to light. Cardiovascular:      Rate and Rhythm: Normal rate and regular rhythm. Heart sounds: Normal heart sounds. No murmur heard. Pulmonary:      Effort: Pulmonary effort is normal. No respiratory distress. Breath sounds: Normal breath sounds. No wheezing, rhonchi or rales. Chest:      Chest wall: No tenderness. Abdominal:      General: Bowel sounds are normal. There is no distension. Palpations: Abdomen is soft. There is no mass. Tenderness: There is no abdominal tenderness. There is no guarding or rebound. Musculoskeletal:         General: Swelling present. No tenderness, deformity or signs of injury. Normal range of motion. Cervical back: Normal range of motion and neck supple. Right lower leg: No edema. Left lower leg: Edema present. Skin:     General: Skin is warm. Findings: No rash. Neurological:      General: No focal deficit present. Mental Status: He is alert and oriented to person, place, and time. Motor: No abnormal muscle tone.    Psychiatric:         Mood and Affect: Mood normal.         Behavior: Behavior normal.         DIAGNOSTIC RESULTS     EKG: All EKG's are interpreted by the Emergency Department Physician who either signs or Co-signs this chart in the absence of a cardiologist.    none    RADIOLOGY:   Non-plain film images such as CT, Ultrasound and MRI are read by the radiologist. Plain radiographic images are visualized and preliminarily interpreted by the emergency physician with the below findings:    Interpretation per the Radiologist below, if available at the time of this note:    US DUP LOWER EXTREMITY LEFT KATLYN   Final Result   No evidence of DVT in the left lower extremity. ED BEDSIDE ULTRASOUND:   Performed by ED Physician - none    LABS:  Labs Reviewed - No data to display    All other labs were within normal range or not returned as of this dictation. EMERGENCY DEPARTMENT COURSE and DIFFERENTIAL DIAGNOSIS/MDM:   Vitals:    Vitals:    02/06/22 2050   BP: 123/71   Pulse: 60   Resp: 18   Temp: 97.9 °F (36.6 °C)   TempSrc: Oral   SpO2: 96%   Weight: 82.1 kg (181 lb)   Height: 5' 9\" (1.753 m)     We did discuss getting a Doppler and he is agreeable. Patient is already on Xarelto so the chances of DVT are low but he does definitely have swelling of the left lower extremity. I did discuss with him that this certainly could be something like a cellulitis as well. CONSULTS:  None    PROCEDURES:  None    FINAL IMPRESSION      1. Left leg swelling    2.  Peripheral edema          DISPOSITION/PLAN   DISPOSITION  home      PATIENT REFERRED TO:  Edgar Plasencia MD  [address] [place] [address]  [phone]      For wound re-check, As needed      DISCHARGE MEDICATIONS:  New Prescriptions    No medications on file       (Please note that portions of this note were completed with a voice recognition program.  Efforts were made to edit the dictations but occasionally words are mis-transcribed.)    Cindy Arechiga MD  Attending Emergency Physician        Sherryle Knapp, MD  02/06/22 2987

## 2022-02-07 NOTE — ED NOTES
Called Yesenia tabares for doppler of patients left leg. States she will be in shortly.       Festus Santamaria, RN  02/06/22 2124

## 2022-02-07 NOTE — ED TRIAGE NOTES
Patient states left leg swelling started a week or so ago went away    then came back Friday. Patient states with elevation swelling has gone down. Patient concerned for blood clot. Patient takes xeralto for one stent placed.

## 2022-06-14 ENCOUNTER — HOSPITAL ENCOUNTER (EMERGENCY)
Age: 82
Discharge: HOME | End: 2022-06-14
Attending: EMERGENCY MEDICINE
Payer: MEDICARE

## 2022-06-14 ENCOUNTER — APPOINTMENT (OUTPATIENT)
Dept: GENERAL RADIOLOGY | Age: 82
End: 2022-06-14
Payer: MEDICARE

## 2022-06-14 VITALS
WEIGHT: 187 LBS | SYSTOLIC BLOOD PRESSURE: 116 MMHG | OXYGEN SATURATION: 95 % | TEMPERATURE: 98.4 F | HEART RATE: 59 BPM | DIASTOLIC BLOOD PRESSURE: 60 MMHG | RESPIRATION RATE: 16 BRPM | BODY MASS INDEX: 26.77 KG/M2 | HEIGHT: 70 IN

## 2022-06-14 DIAGNOSIS — U07.1 COVID-19 VIRUS INFECTION: Primary | ICD-10-CM

## 2022-06-14 LAB
ABSOLUTE EOS #: 0 K/UL (ref 0–0.4)
ABSOLUTE IMMATURE GRANULOCYTE: 0 K/UL (ref 0–0.3)
ABSOLUTE LYMPH #: 0.53 K/UL (ref 1–4.8)
ABSOLUTE MONO #: 0.72 K/UL (ref 0.2–0.8)
ANION GAP SERPL CALCULATED.3IONS-SCNC: 9 MMOL/L (ref 9–17)
BASOPHILS # BLD: 0 %
BASOPHILS ABSOLUTE: 0 K/UL (ref 0–0.2)
BILIRUBIN URINE: NEGATIVE
BUN BLDV-MCNC: 20 MG/DL (ref 8–23)
BUN/CREAT BLD: 12 (ref 9–20)
CALCIUM SERPL-MCNC: 8.6 MG/DL (ref 8.6–10.4)
CHLORIDE BLD-SCNC: 98 MMOL/L (ref 98–107)
CO2: 29 MMOL/L (ref 20–31)
COLOR: YELLOW
COMMENT UA: NORMAL
CREAT SERPL-MCNC: 1.64 MG/DL (ref 0.7–1.2)
EOSINOPHILS RELATIVE PERCENT: 0 % (ref 1–4)
GFR AFRICAN AMERICAN: 49 ML/MIN
GFR NON-AFRICAN AMERICAN: 41 ML/MIN
GFR SERPL CREATININE-BSD FRML MDRD: ABNORMAL ML/MIN/{1.73_M2}
GLUCOSE BLD-MCNC: 84 MG/DL (ref 70–99)
GLUCOSE URINE: NEGATIVE
HCT VFR BLD CALC: 40.8 % (ref 40.7–50.3)
HEMOGLOBIN: 12.8 G/DL (ref 13–17)
IMMATURE GRANULOCYTES: 0 %
KETONES, URINE: NEGATIVE
LEUKOCYTE ESTERASE, URINE: NEGATIVE
LYMPHOCYTES # BLD: 11 % (ref 24–44)
MCH RBC QN AUTO: 30.7 PG (ref 25.2–33.5)
MCHC RBC AUTO-ENTMCNC: 31.4 G/DL (ref 28–38)
MCV RBC AUTO: 97.8 FL (ref 82.6–102.9)
MONOCYTES # BLD: 15 % (ref 1–7)
NITRITE, URINE: NEGATIVE
PDW BLD-RTO: 12.7 % (ref 11.8–14.4)
PH UA: 6 (ref 5–8)
PLATELET # BLD: 156 K/UL (ref 138–453)
PMV BLD AUTO: 10 FL (ref 8.1–13.5)
POTASSIUM SERPL-SCNC: 3.5 MMOL/L (ref 3.7–5.3)
PROTEIN UA: NEGATIVE
RBC # BLD: 4.17 M/UL (ref 4.21–5.77)
SARS-COV-2, RAPID: DETECTED
SEG NEUTROPHILS: 74 % (ref 36–66)
SEGMENTED NEUTROPHILS ABSOLUTE COUNT: 3.55 K/UL (ref 1.8–7.7)
SODIUM BLD-SCNC: 136 MMOL/L (ref 135–144)
SPECIFIC GRAVITY UA: 1.01 (ref 1–1.03)
SPECIMEN DESCRIPTION: ABNORMAL
TURBIDITY: CLEAR
URINE HGB: NEGATIVE
UROBILINOGEN, URINE: NORMAL
WBC # BLD: 4.8 K/UL (ref 3.5–11.3)

## 2022-06-14 PROCEDURE — 71045 X-RAY EXAM CHEST 1 VIEW: CPT

## 2022-06-14 PROCEDURE — 85025 COMPLETE CBC W/AUTO DIFF WBC: CPT

## 2022-06-14 PROCEDURE — 80048 BASIC METABOLIC PNL TOTAL CA: CPT

## 2022-06-14 PROCEDURE — 99284 EMERGENCY DEPT VISIT MOD MDM: CPT

## 2022-06-14 PROCEDURE — 87635 SARS-COV-2 COVID-19 AMP PRB: CPT

## 2022-06-14 PROCEDURE — 81003 URINALYSIS AUTO W/O SCOPE: CPT

## 2022-06-14 ASSESSMENT — ENCOUNTER SYMPTOMS
EYE REDNESS: 0
ABDOMINAL PAIN: 0
VOMITING: 0
CONSTIPATION: 0
DIARRHEA: 0
COLOR CHANGE: 0
COUGH: 0
EYE DISCHARGE: 0
SHORTNESS OF BREATH: 0
FACIAL SWELLING: 0

## 2022-06-14 ASSESSMENT — PAIN - FUNCTIONAL ASSESSMENT: PAIN_FUNCTIONAL_ASSESSMENT: NONE - DENIES PAIN

## 2022-06-15 ENCOUNTER — CARE COORDINATION (OUTPATIENT)
Dept: CARE COORDINATION | Age: 82
End: 2022-06-15

## 2022-06-15 NOTE — ED PROVIDER NOTES
[address] [place] [address] ED  EMERGENCY DEPARTMENT ENCOUNTER      Pt Name: Milagro Torres  MRN: [id number]  [place] 1940  Date of evaluation: 6/14/2022  Provider: Parker Burt MD    CHIEF COMPLAINT       Chief Complaint   Patient presents with    Fever     101 fever at home, 98.4 in triage         HISTORY OF PRESENT ILLNESS  (Location/Symptom, Timing/Onset, Context/Setting, Quality, Duration, Modifying Factors, Severity.)   Milagro Torres is a 81 y.o. male who presents to the emergency department for a fever. He states it was 101 degrees at home about 6:30 PM.  He did not have anything for his elevated temperature and it was found to be 98.4 here upon arrival.  He has not had any symptoms. He does not complain of a cough or shortness of breath or abdominal pain. No headache or dizziness or vomiting. He has a history of pneumonia and his wife wanted him checked for pneumonia. He has had 2 COVID vaccinations as well as 1 booster. Nursing Notes were reviewed.     ALLERGIES     Acetaminophen-codeine, Benadryl [diphenhydramine], Codeine, Hydrocodone-acetaminophen, Meperidine, Morpholine salicylate, Penicillins, and Quinapril    CURRENT MEDICATIONS       Previous Medications    ALBUTEROL SULFATE HFA (VENTOLIN HFA) 108 (90 BASE) MCG/ACT INHALER    Inhale 2 puffs into the lungs every 4 hours as needed for Wheezing    ATORVASTATIN (LIPITOR) 10 MG TABLET    Take 10 mg by mouth nightly    BUMETANIDE (BUMEX) 1 MG TABLET    Take 1 mg by mouth daily    FAMOTIDINE (PEPCID) 20 MG TABLET    Take 20 mg by mouth nightly    GLIMEPIRIDE (AMARYL) 2 MG TABLET    Take 2 mg by mouth every morning (before breakfast)    IPRATROPIUM (ATROVENT HFA) 17 MCG/ACT INHALER    Inhale 1 puff into the lungs 3 times daily    MAGNESIUM OXIDE (MAG-OX) 400 MG TABLET    Take 400 mg by mouth daily     METOPROLOL SUCCINATE (TOPROL XL) 25 MG EXTENDED RELEASE TABLET    Take 25 mg by mouth daily    PENTOXIFYLLINE (TRENTAL) 400 MG EXTENDED RELEASE TABLET Take 400 mg by mouth 2 times daily     PREDNISONE (DELTASONE) 10 MG TABLET    Take 20 mg twice a day for 3 days, take 20 mg in the morning and 10 mg at night for 3 days, take 10 mg twice a day for 3 days, take 10 mg only in the morning for 3 days, then stop    RIVAROXABAN (XARELTO) 20 MG TABS TABLET    Take 20 mg by mouth daily    TAMSULOSIN (FLOMAX) 0.4 MG CAPSULE    Take 0.4 mg by mouth 2 times daily        PAST MEDICAL HISTORY         Diagnosis Date    CAD (coronary artery disease)     [facility]estive [facility] failure) [facility]     [facility]     Hypertension        SURGICAL HISTORY     History reviewed. No pertinent surgical history. FAMILY HISTORY     History reviewed. No pertinent family history. No family status information on file. SOCIAL HISTORY      reports that he has quit smoking. He has never used smokeless tobacco. He reports previous alcohol use. He reports that he does not use drugs. REVIEW OF SYSTEMS    (2-9 systems for level 4, 10 or more for level 5)     Review of Systems   Constitutional: Positive for fever. Negative for chills and fatigue. HENT: Negative for congestion, ear discharge and facial swelling. Eyes: Negative for discharge and redness. Respiratory: Negative for cough and shortness of breath. Cardiovascular: Negative for chest pain. Gastrointestinal: Negative for abdominal pain, constipation, diarrhea and vomiting. Genitourinary: Negative for dysuria and hematuria. Musculoskeletal: Negative for arthralgias. Skin: Negative for color change and rash. Neurological: Negative for syncope, numbness and headaches. Hematological: Negative for adenopathy. Psychiatric/Behavioral: Negative for confusion. The patient is not nervous/anxious. Except as noted above the remainder of the review of systems was reviewed and negative.      PHYSICAL EXAM    (up to 7 for level 4, 8 or more for level 5)     Vitals:    06/14/22 2019   BP: 116/60 Pulse: 59   Resp: 16   Temp: 98.4 °F (36.9 °C)   TempSrc: Oral   SpO2: 95%   Weight: 187 lb (84.8 kg)   Height: 5' 10\" (1.778 m)       Physical Exam  Vitals reviewed. Constitutional:       General: He is not in acute distress. Appearance: He is well-developed. He is not diaphoretic. HENT:      Head: Normocephalic and atraumatic. Eyes:      General: No scleral icterus. Right eye: No discharge. Left eye: No discharge. Cardiovascular:      Rate and Rhythm: Normal rate and regular rhythm. Pulmonary:      Effort: Pulmonary effort is normal. No respiratory distress. Breath sounds: Normal breath sounds. No stridor. No wheezing or rales. Abdominal:      General: There is no distension. Palpations: Abdomen is soft. Tenderness: There is no abdominal tenderness. Musculoskeletal:         General: Normal range of motion. Cervical back: Neck supple. Lymphadenopathy:      Cervical: No cervical adenopathy. Skin:     General: Skin is warm and dry. Findings: No erythema or rash. Neurological:      Mental Status: He is alert and oriented to person, place, and time.    Psychiatric:         Behavior: Behavior normal.             DIAGNOSTIC RESULTS     EKG: All EKG's are interpreted by the Emergency Department Physician who either signs or Co-signs this chart in the absence of a cardiologist.    RADIOLOGY:   Non-plain film images such as CT, Ultrasound and MRI are read by the radiologist. Plain radiographic images are visualized and preliminarily interpreted by the emergency physician with the below findings:    Interpretation per the Radiologist below, if available at the time of this note:    XR CHEST PORTABLE    Result Date: 6/14/2022  EXAMINATION: ONE XRAY VIEW OF THE CHEST 6/14/2022 9:11 pm COMPARISON: 12/23/2021 HISTORY: ORDERING SYSTEM PROVIDED HISTORY: Fever TECHNOLOGIST PROVIDED HISTORY: Fever Reason for Exam: Pt states check for pneumonia, fever today FINDINGS: Cardiomediastinal silhouette is unchanged in size. Aortic tortuosity. There is no pleural effusion or pneumothorax. Linear bibasilar opacities. No acute osseous abnormality. Linear bibasilar opacities, atelectasis versus pneumonia. LABS:  Labs Reviewed   COVID-19, RAPID - Abnormal; Notable for the following components:       Result Value    SARS-CoV-2, Rapid DETECTED (*)     All other components within normal limits   CBC WITH AUTO DIFFERENTIAL - Abnormal; Notable for the following components:    RBC 4.17 (*)     Hemoglobin 12.8 (*)     All other components within normal limits   BASIC METABOLIC PANEL - Abnormal; Notable for the following components:    CREATININE 1.64 (*)     Potassium 3.5 (*)     GFR Non- 41 (*)     GFR  49 (*)     All other components within normal limits   URINALYSIS       All other labs were within normal range or not returned as of this dictation. EMERGENCY DEPARTMENT COURSE and DIFFERENTIAL DIAGNOSIS/MDM:   Vitals:    Vitals:    06/14/22 2019   BP: 116/60   Pulse: 59   Resp: 16   Temp: 98.4 °F (36.9 °C)   TempSrc: Oral   SpO2: 95%   Weight: 187 lb (84.8 kg)   Height: 5' 10\" (1.778 m)       Orders Placed This Encounter   Medications    nirmatrelvir/ritonavir (PAXLOVID) 20 x 150 MG & 10 x 100MG     Sig: Take 3 tablets (two 150 mg nirmatrelvir and one 100 mg ritonavir tablets) by mouth every 12 hours for 5 days. Dispense:  30 tablet     Refill:  0     Order Specific Question:   Does this patient qualify for COVID-19 antIviral therapy based on criteria for treatment? Answer:   Yes       Medical Decision Making: COVID test is positive. He is at risk for progression of his illness so he is prescribed Paxlovid. He was advised to hold the Lipitor while he is on Paxlovid. Treatment diagnosis and follow-up were discussed with the patient.       CONSULTS:  None    PROCEDURES:  None    FINAL IMPRESSION      1. COVID-19 virus infection DISPOSITION/PLAN   DISPOSITION Decision To Discharge 06/14/2022 10:03:14 PM      PATIENT REFERRED TO:   Christel Lawson MD  [address] [phone]      As needed    [facility] ED  [address] [facility]  [phone]    If symptoms worsen      DISCHARGE MEDICATIONS:     New Prescriptions    NIRMATRELVIR/RITONAVIR (PAXLOVID) 20 X 150 MG & 10 X 100MG    Take 3 tablets (two 150 mg nirmatrelvir and one 100 mg ritonavir tablets) by mouth every 12 hours for 5 days. The care is provided during an unprecedented national emergency due to the novel coronavirus, COVID-19.     (Please note that portions of this note were completed with a voice recognition program.  Efforts were made to edit the dictations but occasionally words are mis-transcribed.)    Rey Ha MD  Attending Emergency Physician           Rey Ha MD  06/14/22 1200

## 2022-06-15 NOTE — CARE COORDINATION
Attempted several times to call patient for ED/COVID follow up, unable to leave a message, did not dial, \"call failed\" was message. Called again, left VM message with return contact info asking patient or wife to call me back. Will call again tomorrow.

## 2022-06-15 NOTE — ED NOTES
Pt c/o fever that started around 6:30 tonight. Pt states his temperature was about 101, and pt's temp is 98.4 in triage. Pt denies taking any tylenol/motrin at home for fever. Pt also denies any cough, chills, or SOB.       Francia Crump RN  06/14/22 [id number]

## 2022-06-16 NOTE — CARE COORDINATION
Patient contacted regarding COVID-19 risk, exposure, pulse oximeter ordered at discharge and monoclonal antibody infusion follow up. Discussed COVID-19 related testing which was available at this time. Test results were positive. Patient informed of results, if available? Yes. Ambulatory Care Manager contacted the family by telephone to perform post discharge assessment. Call within 2 business days of discharge: Yes. Verified name and  with family as identifiers. Provided introduction to self, and explanation of the CTN/ACM role, and reason for call due to risk factors for infection and/or exposure to COVID-19. Symptoms reviewed with family who verbalized the following symptoms: fatigue  chills or shaking. Due to no new or worsening symptoms encounter was not routed to provider for escalation. Discussed follow-up appointments. If no appointment was previously scheduled, appointment scheduling offered: No.  [facility] follow up appointment(s): No future appointments. Non-[facility] follow up appointment(s): NA    Non-face-to-face services provided:  Education of patient/family/caregiver/guardian to support self-management-symptom monitoring and treatment     Advance Care Planning:   Does patient have an Advance Directive:  did not address. Educated patient about risk for severe COVID-19 due to risk factors according to CDC guidelines. ACM reviewed discharge instructions, medical action plan and red flag symptoms with the family who verbalized understanding. Discussed COVID vaccination status: Yes. Education provided on COVID-19 vaccination as appropriate. Discussed exposure protocols and quarantine with CDC Guidelines. Family was given an opportunity to verbalize any questions and concerns and agrees to contact ACM or health care provider for questions related to their healthcare.     Reviewed and educated family on any new and changed medications related to discharge diagnosis     Was patient discharged with a pulse oximeter? No but he has one from home. Discussed and confirmed pulse oximeter discharge instructions and when to notify provider or seek emergency care. ACM provided contact information. Plan for follow-up call in 3-5 days based on severity of symptoms and risk factors. Spoke with wife. Patient is staying at a son's house since son who lives with them just had a bone marrow transplant 3 weeks ago. He is taking the Paxlovid, no side effects. He is drinking ok, appetite not great but no vomiting. Pulse ox mid 90s. Fatigued, sleeping a lot. Taking all of his medications, no worsening of COPD or CHF symptoms. Encouraged to call doctor for any symptoms and possibly scheduling VV but for any worsening of breathing or pulse ox below 90 needs to go back to ED. She voiced understanding. Nurse navigator from PCP office has also been calling so no further calls planned but encouraged her to call for any concerns or questions.